# Patient Record
Sex: MALE | Race: WHITE | Employment: UNEMPLOYED | URBAN - NONMETROPOLITAN AREA
[De-identification: names, ages, dates, MRNs, and addresses within clinical notes are randomized per-mention and may not be internally consistent; named-entity substitution may affect disease eponyms.]

---

## 2017-01-01 ENCOUNTER — HOSPITAL ENCOUNTER (OUTPATIENT)
Dept: OTHER | Age: 32
Discharge: HOME | End: 2017-01-31
Attending: PREVENTIVE MEDICINE | Admitting: PREVENTIVE MEDICINE

## 2017-06-22 ENCOUNTER — HOSPITAL ENCOUNTER (OUTPATIENT)
Dept: OTHER | Age: 32
Discharge: HOME | End: 2017-06-30
Attending: PREVENTIVE MEDICINE | Admitting: PREVENTIVE MEDICINE

## 2017-07-01 ENCOUNTER — HOSPITAL ENCOUNTER (OUTPATIENT)
Dept: OTHER | Age: 32
Discharge: HOME | End: 2017-07-31
Attending: PREVENTIVE MEDICINE | Admitting: PREVENTIVE MEDICINE

## 2018-02-06 ENCOUNTER — HOSPITAL ENCOUNTER (OUTPATIENT)
Dept: LAB | Age: 33
Discharge: HOME | End: 2018-02-06
Attending: INTERNAL MEDICINE | Admitting: INTERNAL MEDICINE

## 2018-02-06 LAB
ALBUMIN SERPL-MCNC: 4.3 GM/DL (ref 3.4–5)
ALP BLD-CCNC: 65 IU/L (ref 40–129)
ALT SERPL-CCNC: 11 U/L (ref 10–40)
AST SERPL-CCNC: 16 IU/L (ref 15–37)
BILIRUB SERPL-MCNC: 0.7 MG/DL (ref 0–1)
BILIRUBIN DIRECT: 0.2 MG/DL (ref 0–0.3)
BILIRUBIN, INDIRECT: 0.5 MG/DL (ref 0–0.7)
HEPATITIS B SURFACE ANTIGEN: NON REACTIVE
HEPATITIS C ANTIBODY: ABNORMAL
TOTAL PROTEIN: 6.6 GM/DL (ref 6.4–8.2)

## 2018-02-07 LAB — HIV SCREEN: NON REACTIVE

## 2018-03-16 PROBLEM — F11.93 OPIOID WITHDRAWAL (HCC): Status: ACTIVE | Noted: 2018-03-16

## 2020-10-30 ENCOUNTER — HOSPITAL ENCOUNTER (EMERGENCY)
Age: 35
Discharge: HOME | End: 2020-10-31
Attending: EMERGENCY MEDICINE
Payer: OTHER GOVERNMENT

## 2020-10-30 ENCOUNTER — APPOINTMENT (OUTPATIENT)
Dept: CT IMAGING | Age: 35
End: 2020-10-30
Payer: OTHER GOVERNMENT

## 2020-10-30 LAB
ALBUMIN SERPL-MCNC: 4.2 GM/DL (ref 3.4–5)
ALP BLD-CCNC: 92 IU/L (ref 40–129)
ALT SERPL-CCNC: 9 U/L (ref 10–40)
ANION GAP SERPL CALCULATED.3IONS-SCNC: 13 MMOL/L (ref 4–16)
AST SERPL-CCNC: 13 IU/L (ref 15–37)
BASOPHILS ABSOLUTE: 0 K/CU MM
BASOPHILS RELATIVE PERCENT: 0.2 % (ref 0–1)
BILIRUB SERPL-MCNC: 0.8 MG/DL (ref 0–1)
BILIRUBIN DIRECT: 0.3 MG/DL (ref 0–0.3)
BILIRUBIN, INDIRECT: 0.5 MG/DL (ref 0–0.7)
BUN BLDV-MCNC: 10 MG/DL (ref 6–23)
CALCIUM SERPL-MCNC: 9.7 MG/DL (ref 8.3–10.6)
CHLORIDE BLD-SCNC: 93 MMOL/L (ref 99–110)
CO2: 29 MMOL/L (ref 21–32)
CREAT SERPL-MCNC: 0.7 MG/DL (ref 0.9–1.3)
DIFFERENTIAL TYPE: ABNORMAL
EOSINOPHILS ABSOLUTE: 0.1 K/CU MM
EOSINOPHILS RELATIVE PERCENT: 0.7 % (ref 0–3)
GFR AFRICAN AMERICAN: >60 ML/MIN/1.73M2
GFR NON-AFRICAN AMERICAN: >60 ML/MIN/1.73M2
GLUCOSE BLD-MCNC: 103 MG/DL (ref 70–99)
HCT VFR BLD CALC: 37.6 % (ref 42–52)
HEMOGLOBIN: 12 GM/DL (ref 13.5–18)
IMMATURE NEUTROPHIL %: 0.5 % (ref 0–0.43)
LACTATE: 1.8 MMOL/L (ref 0.4–2)
LYMPHOCYTES ABSOLUTE: 2.2 K/CU MM
LYMPHOCYTES RELATIVE PERCENT: 16.3 % (ref 24–44)
MCH RBC QN AUTO: 25.9 PG (ref 27–31)
MCHC RBC AUTO-ENTMCNC: 31.9 % (ref 32–36)
MCV RBC AUTO: 81 FL (ref 78–100)
MONOCYTES ABSOLUTE: 0.8 K/CU MM
MONOCYTES RELATIVE PERCENT: 5.7 % (ref 0–4)
NUCLEATED RBC %: 0 %
PDW BLD-RTO: 14.5 % (ref 11.7–14.9)
PLATELET # BLD: 277 K/CU MM (ref 140–440)
PMV BLD AUTO: 10 FL (ref 7.5–11.1)
POTASSIUM SERPL-SCNC: 3.6 MMOL/L (ref 3.5–5.1)
RBC # BLD: 4.64 M/CU MM (ref 4.6–6.2)
SEGMENTED NEUTROPHILS ABSOLUTE COUNT: 10.3 K/CU MM
SEGMENTED NEUTROPHILS RELATIVE PERCENT: 76.6 % (ref 36–66)
SODIUM BLD-SCNC: 135 MMOL/L (ref 135–145)
TOTAL IMMATURE NEUTOROPHIL: 0.07 K/CU MM
TOTAL NUCLEATED RBC: 0 K/CU MM
TOTAL PROTEIN: 8.1 GM/DL (ref 6.4–8.2)
WBC # BLD: 13.4 K/CU MM (ref 4–10.5)

## 2020-10-30 PROCEDURE — 96366 THER/PROPH/DIAG IV INF ADDON: CPT

## 2020-10-30 PROCEDURE — 4500000028 HC INTERMEDIATE PROCEDURE

## 2020-10-30 PROCEDURE — 80048 BASIC METABOLIC PNL TOTAL CA: CPT

## 2020-10-30 PROCEDURE — 73206 CT ANGIO UPR EXTRM W/O&W/DYE: CPT

## 2020-10-30 PROCEDURE — 96365 THER/PROPH/DIAG IV INF INIT: CPT

## 2020-10-30 PROCEDURE — 2580000003 HC RX 258: Performed by: EMERGENCY MEDICINE

## 2020-10-30 PROCEDURE — 99284 EMERGENCY DEPT VISIT MOD MDM: CPT

## 2020-10-30 PROCEDURE — 6360000002 HC RX W HCPCS: Performed by: EMERGENCY MEDICINE

## 2020-10-30 PROCEDURE — 87040 BLOOD CULTURE FOR BACTERIA: CPT

## 2020-10-30 PROCEDURE — 83605 ASSAY OF LACTIC ACID: CPT

## 2020-10-30 PROCEDURE — 85025 COMPLETE CBC W/AUTO DIFF WBC: CPT

## 2020-10-30 PROCEDURE — 6360000004 HC RX CONTRAST MEDICATION: Performed by: EMERGENCY MEDICINE

## 2020-10-30 PROCEDURE — 80076 HEPATIC FUNCTION PANEL: CPT

## 2020-10-30 PROCEDURE — 96367 TX/PROPH/DG ADDL SEQ IV INF: CPT

## 2020-10-30 RX ORDER — SODIUM CHLORIDE 0.9 % (FLUSH) 0.9 %
10 SYRINGE (ML) INJECTION 2 TIMES DAILY
Status: DISCONTINUED | OUTPATIENT
Start: 2020-10-30 | End: 2020-10-31 | Stop reason: HOSPADM

## 2020-10-30 RX ADMIN — SODIUM CHLORIDE, PRESERVATIVE FREE 10 ML: 5 INJECTION INTRAVENOUS at 22:23

## 2020-10-30 RX ADMIN — PIPERACILLIN SODIUM, TAZOBACTAM SODIUM 4.5 G: 4; .5 INJECTION, POWDER, LYOPHILIZED, FOR SOLUTION INTRAVENOUS at 21:39

## 2020-10-30 RX ADMIN — VANCOMYCIN HYDROCHLORIDE 1500 MG: 5 INJECTION, POWDER, LYOPHILIZED, FOR SOLUTION INTRAVENOUS at 22:24

## 2020-10-30 RX ADMIN — IOPAMIDOL 80 ML: 755 INJECTION, SOLUTION INTRAVENOUS at 22:18

## 2020-10-30 RX ADMIN — SODIUM CHLORIDE, PRESERVATIVE FREE 10 ML: 5 INJECTION INTRAVENOUS at 22:19

## 2020-10-30 ASSESSMENT — ENCOUNTER SYMPTOMS
SHORTNESS OF BREATH: 0
BACK PAIN: 0
RHINORRHEA: 0
EYE REDNESS: 0
DIARRHEA: 0
VOMITING: 0
COUGH: 0
ABDOMINAL PAIN: 0
NAUSEA: 1
CONSTIPATION: 0
SORE THROAT: 0

## 2020-10-30 ASSESSMENT — PAIN DESCRIPTION - LOCATION: LOCATION: ARM

## 2020-10-30 ASSESSMENT — PAIN SCALES - GENERAL: PAINLEVEL_OUTOF10: 8

## 2020-10-30 ASSESSMENT — PAIN DESCRIPTION - ORIENTATION: ORIENTATION: LEFT
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
## 2020-10-31 VITALS
TEMPERATURE: 98.1 F | OXYGEN SATURATION: 98 % | HEIGHT: 73 IN | WEIGHT: 190 LBS | BODY MASS INDEX: 25.18 KG/M2 | HEART RATE: 86 BPM | DIASTOLIC BLOOD PRESSURE: 76 MMHG | RESPIRATION RATE: 16 BRPM | SYSTOLIC BLOOD PRESSURE: 118 MMHG

## 2020-10-31 LAB
SARS-COV-2, NAAT: NOT DETECTED
SOURCE: NORMAL

## 2020-10-31 PROCEDURE — U0002 COVID-19 LAB TEST NON-CDC: HCPCS

## 2020-10-31 PROCEDURE — 6360000002 HC RX W HCPCS: Performed by: EMERGENCY MEDICINE

## 2020-10-31 PROCEDURE — 90471 IMMUNIZATION ADMIN: CPT | Performed by: EMERGENCY MEDICINE

## 2020-10-31 PROCEDURE — 90714 TD VACC NO PRESV 7 YRS+ IM: CPT | Performed by: EMERGENCY MEDICINE

## 2020-10-31 RX ORDER — DOXYCYCLINE HYCLATE 100 MG
100 TABLET ORAL 2 TIMES DAILY
Qty: 20 TABLET | Refills: 0 | Status: SHIPPED | OUTPATIENT
Start: 2020-10-31 | End: 2020-11-10

## 2020-10-31 RX ORDER — TETANUS AND DIPHTHERIA TOXOIDS ADSORBED 2; 2 [LF]/.5ML; [LF]/.5ML
0.5 INJECTION INTRAMUSCULAR ONCE
Status: COMPLETED | OUTPATIENT
Start: 2020-10-31 | End: 2020-10-31

## 2020-10-31 RX ADMIN — TETANUS AND DIPHTHERIA TOXOIDS ADSORBED 0.5 ML: 2; 2 INJECTION INTRAMUSCULAR at 02:18

## 2020-10-31 ASSESSMENT — PAIN DESCRIPTION - LOCATION: LOCATION: ARM

## 2020-10-31 ASSESSMENT — PAIN SCALES - GENERAL: PAINLEVEL_OUTOF10: 7

## 2020-10-31 ASSESSMENT — PAIN DESCRIPTION - ORIENTATION: ORIENTATION: RIGHT
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
## 2020-11-02 ENCOUNTER — CARE COORDINATION (OUTPATIENT)
Dept: CARE COORDINATION | Age: 35
End: 2020-11-02

## 2020-11-02 NOTE — CARE COORDINATION
Call to check on pt status after recent ER visit for abscess & cellulitis. LM with ACM contact information & requested a call back. PRADEEP Ritchie RN  Ambulatory Care Manager  [phone] office/cell  [phone] fax  [email or web address]

## 2020-11-04 LAB
CULTURE: NORMAL
CULTURE: NORMAL
Lab: NORMAL
Lab: NORMAL
SPECIMEN: NORMAL
SPECIMEN: NORMAL

## 2020-11-05 ENCOUNTER — CARE COORDINATION (OUTPATIENT)
Dept: CARE COORDINATION | Age: 35
End: 2020-11-05

## 2020-11-05 NOTE — CARE COORDINATION
Call to check on pt status after recent ER visit for abscess & cellulitis. LM with ACM contact information & requested a call back. 2nd attempt, no further outreach is planned. TREVOR VilledaN RN  Ambulatory Care Manager  [phone] office/cell  [phone] fax  [email or web address]

## 2020-11-12 ENCOUNTER — HOSPITAL ENCOUNTER (OUTPATIENT)
Dept: PSYCHIATRY | Age: 35
Setting detail: THERAPIES SERIES
Discharge: HOME | End: 2020-11-12
Payer: OTHER GOVERNMENT

## 2020-11-12 PROCEDURE — 90791 PSYCH DIAGNOSTIC EVALUATION: CPT

## 2020-11-12 NOTE — PROGRESS NOTES
[address] [place] [address] Urinalysis Laboratory Testing and Medical History      Location: [x] [place] [] Kelly Forrest MD., [id number], Medical Director of [facility] Director orders for [address] [place] [address] clinical therapists to collect an urine sample from:    Client: Watson Carrel   : 1985   Case# [id number]    Urine sample will be collected following the collections guidelines provided on Clinical Reference Laboratory [facility] [place]) custody form, and completion of the [facility] Non-Federal chain of custody drug screening form. During the course of treatment, randomly a urine sample will be collected, at a minimum of one time a month, more frequently as needed, as part of the clinical outpatient alcohol and drug treatment program at [address] [place] [address]. Medical care recommendation for clients experiencing/reporting  medical concerns, who do not have a family physician and willing to attend  medical care will be assisted in seeking medical care. Clinical providers will refer clients to local family physicians practices as part of the  clients treatment plan and assist the client in gaining access to an appointment. Release of information will be requested to support the  clients seeking medical care. Summary of Medical History  Prior to Admission medications    Medication Sig Start Date End Date Taking?  Authorizing Provider   esomeprazole Magnesium (NEXIUM) 40 MG PACK Take 40 mg by mouth daily    Historical Provider, MD   hydrOXYzine (ATARAX) 50 MG tablet Take 50 mg by mouth every 4 hours as needed for Anxiety    Historical Provider, MD   methocarbamol (ROBAXIN) 500 MG tablet Take 500 mg by mouth 3 times daily    Historical Provider, MD   sertraline (ZOLOFT) 100 MG tablet Take 200 mg by mouth daily    Historical Provider, MD   gabapentin (NEURONTIN) 800 MG tablet Take 800 mg by mouth 4 times daily    Historical Provider, MD     Past Surgical History:   Procedure Laterality Date    ABDOMEN SURGERY      bowel surgery as a       Past Medical History:   Diagnosis Date    Herniated intervertebral disc of lumbar spine     lumbar    Sciatica      Patient Active Problem List    Diagnosis Date Noted    Opioid withdrawal ([facility] 2018    Drug addiction ([facility] 10/26/2016       Jesenia [facility]  /2:98 PM

## 2020-11-12 NOTE — PROGRESS NOTES
medications    Medication Sig Start Date End Date Taking? Authorizing Provider   esomeprazole Magnesium (NEXIUM) 40 MG PACK Take 40 mg by mouth daily    Historical Provider, MD   hydrOXYzine (ATARAX) 50 MG tablet Take 50 mg by mouth every 4 hours as needed for Anxiety    Historical Provider, MD   methocarbamol (ROBAXIN) 500 MG tablet Take 500 mg by mouth 3 times daily    Historical Provider, MD   sertraline (ZOLOFT) 100 MG tablet Take 200 mg by mouth daily    Historical Provider, MD   gabapentin (NEURONTIN) 800 MG tablet Take 800 mg by mouth 4 times daily    Historical Provider, MD     Past Surgical History:   Procedure Laterality Date    ABDOMEN SURGERY      bowel surgery as a       Past Medical History:   Diagnosis Date    Herniated intervertebral disc of lumbar spine     lumbar    Sciatica      Patient Active Problem List    Diagnosis Date Noted    Opioid withdrawal ([facility] 2018    Drug addiction ([facility] 10/26/2016       6. Level of Care Determination:      4 Medically Managed Intensive Inpatient Services      7. Treatment available      __X__yes   _____no         8. Name of program referred:    ____[facility],    ____[facility],       ___X___other/identify    [address] [place] [address]    Client referred to Matt Bentley for Detox.  Client reports that he will follow-up with the  [facility]/Suboxone Program for continued AoD Support    Electronically signed by Sarthak Moore on 2020 at 4:38 PM

## 2020-11-13 ASSESSMENT — LIFESTYLE VARIABLES: HISTORY_ALCOHOL_USE: NO

## 2020-11-13 NOTE — PROGRESS NOTES
[facility]                Progress Note    [x] Jamia  [] Lakisha prajapati                    Patient Name: Jorge Gaxiola   : 1985     Case # :  [id number]  Therapist: Kelechi [place]        Objective/Service/Time:  CASE MANAGEMENT    This writer received a message from [address] [place] [address] who reported that the referral for above client was approved and to call them after 10:00 Friday. This writer called Access (10:26am) and left a message for Adam Mabry reporting that I did receive her message.                       Jailene Mccoy MA, [facility], , 46:04 AM

## 2020-11-25 ENCOUNTER — HOSPITAL ENCOUNTER (EMERGENCY)
Age: 35
Discharge: HOME | DRG: 581 | End: 2020-11-25
Payer: OTHER GOVERNMENT

## 2020-11-25 VITALS
DIASTOLIC BLOOD PRESSURE: 78 MMHG | TEMPERATURE: 99.3 F | RESPIRATION RATE: 15 BRPM | SYSTOLIC BLOOD PRESSURE: 101 MMHG | HEART RATE: 92 BPM | OXYGEN SATURATION: 100 %

## 2020-11-25 PROCEDURE — 99283 EMERGENCY DEPT VISIT LOW MDM: CPT

## 2020-11-25 PROCEDURE — 87077 CULTURE AEROBIC IDENTIFY: CPT

## 2020-11-25 PROCEDURE — 87186 SC STD MICRODIL/AGAR DIL: CPT

## 2020-11-25 PROCEDURE — 10060 I&D ABSCESS SIMPLE/SINGLE: CPT

## 2020-11-25 PROCEDURE — 87070 CULTURE OTHR SPECIMN AEROBIC: CPT

## 2020-11-25 PROCEDURE — 87075 CULTR BACTERIA EXCEPT BLOOD: CPT

## 2020-11-25 PROCEDURE — 2500000003 HC RX 250 WO HCPCS: Performed by: PHYSICIAN ASSISTANT

## 2020-11-25 RX ORDER — LIDOCAINE HYDROCHLORIDE 20 MG/ML
10 INJECTION, SOLUTION EPIDURAL; INFILTRATION; INTRACAUDAL; PERINEURAL ONCE
Status: COMPLETED | OUTPATIENT
Start: 2020-11-25 | End: 2020-11-25

## 2020-11-25 RX ORDER — SULFAMETHOXAZOLE AND TRIMETHOPRIM 800; 160 MG/1; MG/1
1 TABLET ORAL 2 TIMES DAILY
Qty: 20 TABLET | Refills: 0 | Status: ON HOLD | OUTPATIENT
Start: 2020-11-25 | End: 2020-12-01 | Stop reason: HOSPADM

## 2020-11-25 RX ADMIN — LIDOCAINE HYDROCHLORIDE 10 ML: 20 INJECTION, SOLUTION EPIDURAL; INFILTRATION; INTRACAUDAL; PERINEURAL at 08:57

## 2020-11-25 ASSESSMENT — PAIN SCALES - GENERAL: PAINLEVEL_OUTOF10: 10

## 2020-11-25 NOTE — ED NOTES
Wound cleansed and dressed appropriately. Wound care discussed. Discharge instructions reviewed with patient. Medication and follow up were discussed.  Patient denies further questions and verbalizes understanding     Livan RN  11/25/20 0924

## 2020-11-25 NOTE — ED PROVIDER NOTES
EMERGENCY DEPARTMENT ENCOUNTER      PCP: No primary care provider on file. CHIEF COMPLAINT    Chief Complaint   Patient presents with    Abscess       This patient was not evaluated by the attending physician. I have independently evaluated this patient. HPI    Watson Carrel is a 37 y.o. male who presents with swelling and redness to right forearm. Onset over the past day or 2. Patient states he is a previous IV drug user, has not used in 5 days. Patient recently discharged from rehab. Patient states he has had abscess in the past.  Patient denies chest pain, shortness of breath, abdominal pain, vomiting or diarrhea. Patient denies fever. Pain worsens with palpation and movement. No known alleviating factors.       REVIEW OF SYSTEMS    Constitutional:  Denies fever  HENT:  Denies sore throat or ear pain   Respiratory:  Denies cough or shortness of breath   Cardiovascular:  Denies chest pain, palpitations or swelling   GI:  Denies abdominal pain, vomiting, or diarrhea   Musculoskeletal:  Denies back pain   Skin:  See HPI  Neurologic:  Denies headache, focal weakness or sensory changes   Lymphatic:  Denies swollen glands     All other review of systems are negative  See HPI and nursing notes for additional information     PAST MEDICAL HISTORY    Past Medical History:   Diagnosis Date    Herniated intervertebral disc of lumbar spine     lumbar    Sciatica        SURGICAL HISTORY    Past Surgical History:   Procedure Laterality Date    ABDOMEN SURGERY      bowel surgery as a         CURRENT MEDICATIONS    Current Outpatient Rx   Medication Sig Dispense Refill    esomeprazole Magnesium (NEXIUM) 40 MG PACK Take 40 mg by mouth daily      hydrOXYzine (ATARAX) 50 MG tablet Take 50 mg by mouth every 4 hours as needed for Anxiety      methocarbamol (ROBAXIN) 500 MG tablet Take 500 mg by mouth 3 times daily      sertraline (ZOLOFT) 100 MG tablet Take 200 mg by mouth daily      gabapentin Constitutional:  Well developed, well nourished, no acute distress, non-toxic appearance   HENT:  Atraumatic, Normocephalic, PERRL. Respiratory:  No respiratory distress, normal breath sounds   Cardiovascular:  Normal rate, normal rhythm, peripheral pulses equal, no edema  GI:  Soft, nondistended, nontender  Musculoskeletal:  No edema, no tenderness, no deformities. Integument: Right proximal forearm along the medial aspect there is an area of erythema measuring approximately 4 cm x 3 cm. Area is fluctuant with surrounding induration. No lymphatic streaking. Distal sensation and pulses intact. Normal range of motion at elbow. Lymphatics: No palpable axillary lymphadenopathy. Neurological: alert and oriented, no focal deficits         ________________________________________________________________________    PROCEDURES: Incision and Drainage Procedure Note    Indication: Cutaneous Abscess    Procedure:    - Procedure explained, including risks and benefits explained to the patient who expressed understanding. All questions were answered. Verbal consent obtained. - The patient was positioned appropriately and Area was prepped and draped in the usual sterile fashion using Betadine.     - The affected area was anesthetized using 2% lidocaine with 7 mL. - Area of greatest induration was incised utilizing a #11 blade - incision length was approximately 2 cm. - Wound culture was obtained and sent to lab. - large amount of pus mixed with blood was expressed. Loculations were broken up using a blunt probe technique. Cavity was irrigated with sterile saline.   - Wound cavity was packed with 1/4\" iodoform gauze   - Wound was dressed with sterile nonstick adhesive gauze, sterile 4x4 gauze, and tape       The patient tolerated the procedure well without complication.     ________________________________________________________________________      ED COURSE & MEDICAL DECISION MAKING      Patient presents as above. Patient's vital signs are stable. Patient range of motion is at elbow without problem. This does not appear to be septic joint. No lymphatic streaking. Patient declines lab work. See above procedure note for incision and drainage. Patient started on Bactrim DS. I recommend over-the-counter ibuprofen and Tylenol as needed for pain. Recommend follow-up with primary care provider in 2 to 3 days for recheck. Clinical  IMPRESSION    Cutaneous Abscess      Diagnosis and plan discussed in detail with patient who understands and agrees. Patient agrees to return emergency department if symptoms worsen or any new symptoms develop. Comment: Please note this report has been produced using speech recognition software and may contain errors related to that system including errors in grammar, punctuation, and spelling, as well as words and phrases that may be inappropriate. If there are any questions or concerns please feel free to contact the dictating provider for clarification.         Chaparro Farrell PA-C  11/25/20 8087

## 2020-11-28 ENCOUNTER — APPOINTMENT (OUTPATIENT)
Dept: GENERAL RADIOLOGY | Age: 35
DRG: 581 | End: 2020-11-28
Payer: OTHER GOVERNMENT

## 2020-11-28 ENCOUNTER — HOSPITAL ENCOUNTER (INPATIENT)
Age: 35
LOS: 3 days | Discharge: HOME HEALTH | DRG: 581 | End: 2020-12-01
Attending: INTERNAL MEDICINE | Admitting: INTERNAL MEDICINE
Payer: OTHER GOVERNMENT

## 2020-11-28 ENCOUNTER — APPOINTMENT (OUTPATIENT)
Dept: CT IMAGING | Age: 35
DRG: 581 | End: 2020-11-28
Payer: OTHER GOVERNMENT

## 2020-11-28 PROBLEM — L02.413 ABSCESS OF FOREARM, RIGHT: Status: ACTIVE | Noted: 2020-11-28

## 2020-11-28 LAB
ALBUMIN SERPL-MCNC: 3.9 GM/DL (ref 3.4–5)
ALP BLD-CCNC: 78 IU/L (ref 40–129)
ALT SERPL-CCNC: 6 U/L (ref 10–40)
ANION GAP SERPL CALCULATED.3IONS-SCNC: 12 MMOL/L (ref 4–16)
AST SERPL-CCNC: 15 IU/L (ref 15–37)
BASOPHILS ABSOLUTE: 0 K/CU MM
BASOPHILS RELATIVE PERCENT: 0.4 % (ref 0–1)
BILIRUB SERPL-MCNC: 0.5 MG/DL (ref 0–1)
BUN BLDV-MCNC: 7 MG/DL (ref 6–23)
CALCIUM SERPL-MCNC: 9.3 MG/DL (ref 8.3–10.6)
CHLORIDE BLD-SCNC: 97 MMOL/L (ref 99–110)
CO2: 26 MMOL/L (ref 21–32)
CREAT SERPL-MCNC: 0.7 MG/DL (ref 0.9–1.3)
DIFFERENTIAL TYPE: ABNORMAL
EOSINOPHILS ABSOLUTE: 0.1 K/CU MM
EOSINOPHILS RELATIVE PERCENT: 0.8 % (ref 0–3)
GFR AFRICAN AMERICAN: >60 ML/MIN/1.73M2
GFR NON-AFRICAN AMERICAN: >60 ML/MIN/1.73M2
GLUCOSE BLD-MCNC: 104 MG/DL (ref 70–99)
HCT VFR BLD CALC: 43 % (ref 42–52)
HEMOGLOBIN: 14 GM/DL (ref 13.5–18)
IMMATURE NEUTROPHIL %: 0.5 % (ref 0–0.43)
LACTATE: 1.2 MMOL/L (ref 0.4–2)
LYMPHOCYTES ABSOLUTE: 1.4 K/CU MM
LYMPHOCYTES RELATIVE PERCENT: 13 % (ref 24–44)
MCH RBC QN AUTO: 25.5 PG (ref 27–31)
MCHC RBC AUTO-ENTMCNC: 32.6 % (ref 32–36)
MCV RBC AUTO: 78.2 FL (ref 78–100)
MONOCYTES ABSOLUTE: 0.6 K/CU MM
MONOCYTES RELATIVE PERCENT: 5 % (ref 0–4)
NUCLEATED RBC %: 0 %
PDW BLD-RTO: 15.9 % (ref 11.7–14.9)
PLATELET # BLD: 304 K/CU MM (ref 140–440)
PMV BLD AUTO: 9.7 FL (ref 7.5–11.1)
POTASSIUM SERPL-SCNC: 5 MMOL/L (ref 3.5–5.1)
RBC # BLD: 5.5 M/CU MM (ref 4.6–6.2)
REASON FOR REJECTION: NORMAL
REASON FOR REJECTION: NORMAL
REJECTED TEST: NORMAL
REJECTED TEST: NORMAL
SEGMENTED NEUTROPHILS ABSOLUTE COUNT: 8.9 K/CU MM
SEGMENTED NEUTROPHILS RELATIVE PERCENT: 80.3 % (ref 36–66)
SODIUM BLD-SCNC: 135 MMOL/L (ref 135–145)
TOTAL IMMATURE NEUTOROPHIL: 0.06 K/CU MM
TOTAL NUCLEATED RBC: 0 K/CU MM
TOTAL PROTEIN: 7.5 GM/DL (ref 6.4–8.2)
TROPONIN T: <0.01 NG/ML
WBC # BLD: 11.1 K/CU MM (ref 4–10.5)

## 2020-11-28 PROCEDURE — 96367 TX/PROPH/DG ADDL SEQ IV INF: CPT

## 2020-11-28 PROCEDURE — 36415 COLL VENOUS BLD VENIPUNCTURE: CPT

## 2020-11-28 PROCEDURE — 84484 ASSAY OF TROPONIN QUANT: CPT

## 2020-11-28 PROCEDURE — 2580000003 HC RX 258: Performed by: INTERNAL MEDICINE

## 2020-11-28 PROCEDURE — 96365 THER/PROPH/DIAG IV INF INIT: CPT

## 2020-11-28 PROCEDURE — 80053 COMPREHEN METABOLIC PANEL: CPT

## 2020-11-28 PROCEDURE — 83605 ASSAY OF LACTIC ACID: CPT

## 2020-11-28 PROCEDURE — 6360000002 HC RX W HCPCS: Performed by: INTERNAL MEDICINE

## 2020-11-28 PROCEDURE — 94761 N-INVAS EAR/PLS OXIMETRY MLT: CPT

## 2020-11-28 PROCEDURE — 6360000002 HC RX W HCPCS: Performed by: PHYSICIAN ASSISTANT

## 2020-11-28 PROCEDURE — 85025 COMPLETE CBC W/AUTO DIFF WBC: CPT

## 2020-11-28 PROCEDURE — 71045 X-RAY EXAM CHEST 1 VIEW: CPT

## 2020-11-28 PROCEDURE — 6370000000 HC RX 637 (ALT 250 FOR IP): Performed by: INTERNAL MEDICINE

## 2020-11-28 PROCEDURE — 73201 CT UPPER EXTREMITY W/DYE: CPT

## 2020-11-28 PROCEDURE — 2580000003 HC RX 258: Performed by: PHYSICIAN ASSISTANT

## 2020-11-28 PROCEDURE — 6360000004 HC RX CONTRAST MEDICATION: Performed by: PHYSICIAN ASSISTANT

## 2020-11-28 PROCEDURE — 93005 ELECTROCARDIOGRAM TRACING: CPT | Performed by: PHYSICIAN ASSISTANT

## 2020-11-28 PROCEDURE — 96366 THER/PROPH/DIAG IV INF ADDON: CPT

## 2020-11-28 PROCEDURE — 1200000000 HC SEMI PRIVATE

## 2020-11-28 PROCEDURE — 87040 BLOOD CULTURE FOR BACTERIA: CPT

## 2020-11-28 PROCEDURE — 99285 EMERGENCY DEPT VISIT HI MDM: CPT

## 2020-11-28 RX ORDER — GABAPENTIN 400 MG/1
800 CAPSULE ORAL 4 TIMES DAILY
Status: DISCONTINUED | OUTPATIENT
Start: 2020-11-28 | End: 2020-11-28

## 2020-11-28 RX ORDER — METHOCARBAMOL 500 MG/1
500 TABLET, FILM COATED ORAL 3 TIMES DAILY
Status: DISCONTINUED | OUTPATIENT
Start: 2020-11-28 | End: 2020-12-01 | Stop reason: HOSPADM

## 2020-11-28 RX ORDER — ACETAMINOPHEN 325 MG/1
650 TABLET ORAL EVERY 6 HOURS PRN
Status: DISCONTINUED | OUTPATIENT
Start: 2020-11-28 | End: 2020-12-01 | Stop reason: HOSPADM

## 2020-11-28 RX ORDER — SERTRALINE HYDROCHLORIDE 100 MG/1
200 TABLET, FILM COATED ORAL DAILY
Status: DISCONTINUED | OUTPATIENT
Start: 2020-11-29 | End: 2020-11-28

## 2020-11-28 RX ORDER — ACETAMINOPHEN 650 MG/1
650 SUPPOSITORY RECTAL EVERY 6 HOURS PRN
Status: DISCONTINUED | OUTPATIENT
Start: 2020-11-28 | End: 2020-12-01 | Stop reason: HOSPADM

## 2020-11-28 RX ORDER — ESOMEPRAZOLE MAGNESIUM 40 MG/1
40 FOR SUSPENSION ORAL
Status: DISCONTINUED | OUTPATIENT
Start: 2020-11-29 | End: 2020-11-28 | Stop reason: CLARIF

## 2020-11-28 RX ORDER — SODIUM CHLORIDE 9 MG/ML
INJECTION, SOLUTION INTRAVENOUS CONTINUOUS
Status: DISCONTINUED | OUTPATIENT
Start: 2020-11-28 | End: 2020-12-01 | Stop reason: HOSPADM

## 2020-11-28 RX ORDER — PANTOPRAZOLE SODIUM 40 MG/1
40 TABLET, DELAYED RELEASE ORAL
Status: DISCONTINUED | OUTPATIENT
Start: 2020-11-29 | End: 2020-12-01 | Stop reason: HOSPADM

## 2020-11-28 RX ORDER — ONDANSETRON 2 MG/ML
4 INJECTION INTRAMUSCULAR; INTRAVENOUS EVERY 6 HOURS PRN
Status: DISCONTINUED | OUTPATIENT
Start: 2020-11-28 | End: 2020-12-01 | Stop reason: HOSPADM

## 2020-11-28 RX ORDER — BUPRENORPHINE HYDROCHLORIDE AND NALOXONE HYDROCHLORIDE DIHYDRATE 8; 2 MG/1; MG/1
1 TABLET SUBLINGUAL 3 TIMES DAILY
COMMUNITY

## 2020-11-28 RX ORDER — PROMETHAZINE HYDROCHLORIDE 12.5 MG/1
12.5 TABLET ORAL EVERY 6 HOURS PRN
Status: DISCONTINUED | OUTPATIENT
Start: 2020-11-28 | End: 2020-12-01 | Stop reason: HOSPADM

## 2020-11-28 RX ORDER — POLYETHYLENE GLYCOL 3350 17 G/17G
17 POWDER, FOR SOLUTION ORAL DAILY PRN
Status: DISCONTINUED | OUTPATIENT
Start: 2020-11-28 | End: 2020-12-01 | Stop reason: HOSPADM

## 2020-11-28 RX ORDER — NICOTINE 21 MG/24HR
1 PATCH, TRANSDERMAL 24 HOURS TRANSDERMAL DAILY
Status: DISCONTINUED | OUTPATIENT
Start: 2020-11-28 | End: 2020-12-01 | Stop reason: HOSPADM

## 2020-11-28 RX ORDER — BUPRENORPHINE HYDROCHLORIDE AND NALOXONE HYDROCHLORIDE DIHYDRATE 8; 2 MG/1; MG/1
1 TABLET SUBLINGUAL 3 TIMES DAILY
Status: DISCONTINUED | OUTPATIENT
Start: 2020-11-28 | End: 2020-12-01 | Stop reason: HOSPADM

## 2020-11-28 RX ORDER — SODIUM CHLORIDE 0.9 % (FLUSH) 0.9 %
10 SYRINGE (ML) INJECTION PRN
Status: DISCONTINUED | OUTPATIENT
Start: 2020-11-28 | End: 2020-12-01 | Stop reason: HOSPADM

## 2020-11-28 RX ORDER — GABAPENTIN 400 MG/1
800 CAPSULE ORAL 3 TIMES DAILY
Status: DISCONTINUED | OUTPATIENT
Start: 2020-11-29 | End: 2020-12-01 | Stop reason: HOSPADM

## 2020-11-28 RX ORDER — SODIUM CHLORIDE 0.9 % (FLUSH) 0.9 %
10 SYRINGE (ML) INJECTION EVERY 12 HOURS SCHEDULED
Status: DISCONTINUED | OUTPATIENT
Start: 2020-11-28 | End: 2020-12-01 | Stop reason: HOSPADM

## 2020-11-28 RX ADMIN — METHOCARBAMOL TABLETS 500 MG: 500 TABLET, COATED ORAL at 22:17

## 2020-11-28 RX ADMIN — VANCOMYCIN HYDROCHLORIDE 1500 MG: 5 INJECTION, POWDER, LYOPHILIZED, FOR SOLUTION INTRAVENOUS at 14:31

## 2020-11-28 RX ADMIN — CEFEPIME 2 G: 2 INJECTION, POWDER, FOR SOLUTION INTRAVENOUS at 13:56

## 2020-11-28 RX ADMIN — IOPAMIDOL 80 ML: 755 INJECTION, SOLUTION INTRAVENOUS at 16:39

## 2020-11-28 RX ADMIN — CEFEPIME HYDROCHLORIDE 1 G: 1 INJECTION, POWDER, FOR SOLUTION INTRAMUSCULAR; INTRAVENOUS at 22:17

## 2020-11-28 RX ADMIN — GABAPENTIN 800 MG: 400 CAPSULE ORAL at 22:17

## 2020-11-28 RX ADMIN — SODIUM CHLORIDE: 9 INJECTION, SOLUTION INTRAVENOUS at 21:04

## 2020-11-28 RX ADMIN — BUPRENORPHINE AND NALOXONE 1 TABLET: 8; 2 TABLET SUBLINGUAL at 22:17

## 2020-11-28 RX ADMIN — SODIUM CHLORIDE, PRESERVATIVE FREE 10 ML: 5 INJECTION INTRAVENOUS at 21:09

## 2020-11-28 ASSESSMENT — PAIN DESCRIPTION - DESCRIPTORS
DESCRIPTORS: ACHING;CONSTANT
DESCRIPTORS: CONSTANT
DESCRIPTORS: ACHING;CONSTANT

## 2020-11-28 ASSESSMENT — PAIN SCALES - GENERAL
PAINLEVEL_OUTOF10: 7
PAINLEVEL_OUTOF10: 8
PAINLEVEL_OUTOF10: 7
PAINLEVEL_OUTOF10: 7

## 2020-11-28 ASSESSMENT — PAIN DESCRIPTION - PAIN TYPE
TYPE: ACUTE PAIN

## 2020-11-28 ASSESSMENT — PAIN DESCRIPTION - LOCATION
LOCATION: ARM

## 2020-11-28 ASSESSMENT — PAIN DESCRIPTION - ORIENTATION
ORIENTATION: RIGHT

## 2020-11-28 NOTE — ED PROVIDER NOTES
The Ekg interpreted by me shows  normal sinus rhythm with a rate of 86  Axis is   Normal  QTc is  prolonged  Incomplete RBBB     ST Segments: no acute change  No old EKG            Carmen Llanes MD  11/28/20 0205

## 2020-11-28 NOTE — ED NOTES
Waited for labs to be drawn and resulted before taking pt to CT per protocol   3349,1684 no labs/IV  2467,6218, [id number] iv but no labs

## 2020-11-28 NOTE — CONSULTS
[facility]    Romel Tamayo is a 37 y.o. male started on vancomycin for Wound infection. Pharmacy consulted by ED provider DANY Hurd to order a dose of vancomycin in the emergency department. Other antimicrobials: none. Ht Readings from Last 1 Encounters:   11/28/20 6' (1.829 m)     Wt Readings from Last 3 Encounters:   11/28/20 170 lb (77.1 kg)   10/30/20 190 lb (86.2 kg)   08/18/18 180 lb (81.6 kg)        Pertinent Laboratory Values:   Temp Readings from Last 3 Encounters:   11/28/20 98.1 °F (36.7 °C) (Oral)   11/25/20 99.3 °F (37.4 °C) (Oral)   10/30/20 98.1 °F (36.7 °C)     No results for input(s): WBC, LACTATE in the last 72 hours. No results for input(s): BUN, CREATININE in the last 72 hours. CrCl cannot be calculated (Patient's most recent lab result is older than the maximum 10 days allowed. ). No intake or output data in the 24 hours ending 11/28/20 1221    Assessment/Plan:  Pharmacy will order vancomycin 1500 mg (20 mg/kg). Please note, pharmacy will order a one-time dose of vancomycin for the Emergency Department. The consult will need to be re-ordered if vancomycin is to continue upon admission. Thank you for the consult.   Ramya Jimenes Hence  11/28/2020 12:21 PM

## 2020-11-28 NOTE — ED PROVIDER NOTES
Patient Identification  Flora Clark is a 37 y.o. male    Chief Complaint  Wound Check (reports I&D to right arm abscess, redness around site)      HPI  (History provided by patient)  This is a 37 y.o. male who was brought in by self for chief complaint of wound check. Patient admits to history of IV drug abuse, last use 10 days ago. Developed abscess in right forearm and was seen here 3 days ago and had incision and drainage performed, states that he thought he was getting better until this morning when area is much more swollen and red and painful. Reports 8/10 aching constant pain. No numbness or weakness. No fevers. Notes occasional odd sensation in chest that is not currently present. No shortness of breath. No swelling in legs. REVIEW OF SYSTEMS    Constitutional:  Denies fever, chills  HENT:  Denies sore throat or ear pain   Eyes: Denies vision changes, eye pain  Cardiovascular:  Denies chest pain, syncope  Respiratory:  Denies shortness of breath, cough   GI:  Denies abdominal pain, nausea, vomiting  :  Denies dysuria, discharge  Musculoskeletal:  Denies back pain. + arm pain  Skin:  Denies pruritis.  + rash  Neurologic:  Denies headache, focal weakness, or sensory changes     See HPI and nursing notes for additional information     I have reviewed the following nursing documentation:  Allergies: Allergies   Allergen Reactions    Banana Swelling    Ceclor [Cefaclor] Rash    Amoxicillin Diarrhea       Past medical history:  has a past medical history of Herniated intervertebral disc of lumbar spine and Sciatica. Past surgical history:  has a past surgical history that includes Abdomen surgery. Home medications:   Prior to Admission medications    Medication Sig Start Date End Date Taking?  Authorizing Provider   sulfamethoxazole-trimethoprim (BACTRIM DS) 800-160 MG per tablet Take 1 tablet by mouth 2 times daily for 10 days 11/25/20 12/5/20  Brendon Clements PA-C esomeprazole Magnesium (NEXIUM) 40 MG PACK Take 40 mg by mouth daily    Historical Provider, MD   hydrOXYzine (ATARAX) 50 MG tablet Take 50 mg by mouth every 4 hours as needed for Anxiety    Historical Provider, MD   methocarbamol (ROBAXIN) 500 MG tablet Take 500 mg by mouth 3 times daily    Historical Provider, MD   sertraline (ZOLOFT) 100 MG tablet Take 200 mg by mouth daily    Historical Provider, MD   gabapentin (NEURONTIN) 800 MG tablet Take 800 mg by mouth 4 times daily    Historical Provider, MD       Social history:  reports that he has been smoking cigarettes. He has been smoking about 1.00 pack per day. He has never used smokeless tobacco. He reports current drug use. Drugs: Marijuana and IV. He reports that he does not drink alcohol. Family history:    Family History   Problem Relation Age of Onset    High Blood Pressure Father     Alcohol Abuse Father     Heart Disease Brother     Alcohol Abuse Maternal Grandmother     Alcohol Abuse Maternal Grandfather     Heart Disease Paternal Grandfather     Anxiety Disorder Brother          Exam  BP (!) 102/49   Pulse 65   Temp 98.1 °F (36.7 °C) (Oral)   Resp 17   Ht 6' (1.829 m)   Wt 170 lb (77.1 kg)   SpO2 99%   BMI 23.06 kg/m²   Nursing note and vitals reviewed. Constitutional: Well developed, well nourished. No acute distress. HENT:      Head: Normocephalic and atraumatic. Ears: External ears normal.      Nose: Nose normal.     Mouth: Membrane mucosa moist and pink. No posterior oropharynx erythema or tonsillar edema  Eyes: Anicteric sclera. No discharge, PERRL  Neck: Supple. Trachea midline. Cardiovascular: RRR, no murmurs, rubs, or gallops, radial pulses 2+ bilaterally. Pulmonary/Chest: Effort normal. No respiratory distress. CTAB. No stridor. No wheezes. No rales. Abdominal: Soft. Nontender to palpation. No distension. No guarding, rebound tenderness, or evidence of ascites. : No CVA tenderness.     Musculoskeletal: Moves all extremities. No gross deformity. Neurological: Alert and oriented to person, place, and time. Normal muscle tone.  strength 5 out of 5 bilaterally. Sensation light touch intact throughout the bilateral upper extremities. Skin: Warm and dry. There is a large area of erythema and beefy edema noted on the proximal right ulnar forearm. Psychiatric: Normal mood and affect. Behavior is normal.      EKG   Please see Dr. Da Martinez note for EKG read. Radiographs (if obtained):  [] The following radiograph was interpreted by myself in the absence of a radiologist:   [x] Radiologist's Report Reviewed:  CT RADIUS ULNA RIGHT W CONTRAST   Final Result   1. 9.5 cm abscess in the ulnar soft tissues of the mid and proximal forearm. 2. Extensive cellulitis. 3. No acute osseous abnormality. XR CHEST PORTABLE   Final Result   1. No acute cardiopulmonary disease.                 Labs  Results for orders placed or performed during the hospital encounter of 11/28/20   CBC Auto Differential   Result Value Ref Range    WBC 11.1 (H) 4.0 - 10.5 K/CU MM    RBC 5.50 4.6 - 6.2 M/CU MM    Hemoglobin 14.0 13.5 - 18.0 GM/DL    Hematocrit 43.0 42 - 52 %    MCV 78.2 78 - 100 FL    MCH 25.5 (L) 27 - 31 PG    MCHC 32.6 32.0 - 36.0 %    RDW 15.9 (H) 11.7 - 14.9 %    Platelets 889 104 - 742 K/CU MM    MPV 9.7 7.5 - 11.1 FL    Differential Type AUTOMATED DIFFERENTIAL     Segs Relative 80.3 (H) 36 - 66 %    Lymphocytes % 13.0 (L) 24 - 44 %    Monocytes % 5.0 (H) 0 - 4 %    Eosinophils % 0.8 0 - 3 %    Basophils % 0.4 0 - 1 %    Segs Absolute 8.9 K/CU MM    Lymphocytes Absolute 1.4 K/CU MM    Monocytes Absolute 0.6 K/CU MM    Eosinophils Absolute 0.1 K/CU MM    Basophils Absolute 0.0 K/CU MM    Nucleated RBC % 0.0 %    Total Nucleated RBC 0.0 K/CU MM    Total Immature Neutrophil 0.06 K/CU MM    Immature Neutrophil % 0.5 (H) 0 - 0.43 %   Lactic Acid, Plasma   Result Value Ref Range    Lactate 1.2 0.4 - 2.0 mMOL/L SPECIMEN REJECTION   Result Value Ref Range    Rejected Test CMPR     Reason for Rejection UNABLE TO PERFORM TESTING:    SPECIMEN REJECTION   Result Value Ref Range    Rejected Test TROT     Reason for Rejection UNABLE TO PERFORM TESTING:    Comprehensive Metabolic Panel   Result Value Ref Range    Sodium 135 135 - 145 MMOL/L    Potassium 5.0 3.5 - 5.1 MMOL/L    Chloride 97 (L) 99 - 110 mMol/L    CO2 26 21 - 32 MMOL/L    BUN 7 6 - 23 MG/DL    CREATININE 0.7 (L) 0.9 - 1.3 MG/DL    Glucose 104 (H) 70 - 99 MG/DL    Calcium 9.3 8.3 - 10.6 MG/DL    Alb 3.9 3.4 - 5.0 GM/DL    Total Protein 7.5 6.4 - 8.2 GM/DL    Total Bilirubin 0.5 0.0 - 1.0 MG/DL    ALT 6 (L) 10 - 40 U/L    AST 15 15 - 37 IU/L    Alkaline Phosphatase 78 40 - 129 IU/L    GFR Non-African American >60 >60 mL/min/1.73m2    GFR African American >60 >60 mL/min/1.73m2    Anion Gap 12 4 - 16   Troponin   Result Value Ref Range    Troponin T <0.010 <0.01 NG/ML   EKG 12 Lead   Result Value Ref Range    Ventricular Rate 86 BPM    Atrial Rate 86 BPM    P-R Interval 154 ms    QRS Duration 94 ms    Q-T Interval 402 ms    QTc Calculation (Bazett) 481 ms    P Axis 58 degrees    R Axis 64 degrees    T Axis 58 degrees    Diagnosis       Normal sinus rhythm  Incomplete right bundle branch block  Nonspecific T wave abnormality  Prolonged QT  Abnormal ECG  When compared with ECG of 18-AUG-2018 21:07,  QT has lengthened           MDM  Patient presents for worsening abscess in right forearm. Had I&D performed here few days ago and initially was better but much worse today. Has a large area of swelling and redness in the right ulnar forearm. CT concerning for large abscess. White count is elevated with mild left shift. Lactic acid normal.  Of note patient did complain of some occasional chest discomfort, EKG is normal sinus rhythm with right bundle branch block, chest x-ray negative, troponin negative. Plan is to admit.   Consult placed to Dr. Melissa Anderson with surgery who agreed to consult on patient, likely operative management tomorrow morning. Requesting patient be n.p.o. at midnight and no Lovenox. Consult placed to hospitalist who agreed to admit. In consideration of current COVID19 pandemic, with effort to minimize unnecessary provider exposure, this patient was seen at bedside by me independently. However, in compliance with current hospital [facility]/ED protocol, prior to admission I did discuss this patient case with emergency department physician, Dr. Tim Villasenor. Of note, this Pt was NOT admitted to the ICU. Final Impression  1. Abscess of forearm, right    2. Failure of outpatient treatment    3. IV drug abuse ([address]    4. Chest pain, unspecified type        Blood pressure (!) 102/49, pulse 65, temperature 98.1 °F (36.7 °C), temperature source Oral, resp. rate 17, height 6' (1.829 m), weight 170 lb (77.1 kg), SpO2 99 %. Disposition:  Admit to med/surg floor in stable condition. Patient was given scripts for the following medications. I counseled patient how to take these medications. New Prescriptions    No medications on file       This chart was generated using the [address] [place] [address] dictation system. I created this record but it may contain dictation errors given the limitations of this technology.        [address] [facility]  11/28/20 8733

## 2020-11-28 NOTE — ED NOTES
Pt stated he was in the ED the day before Thanksgiving for I&D of his right arm, pt presented today with redness and swelling to right arm, minimal drainage noted at this time. Pt stated having chest pain.      Tammi Aparicio, RN  11/28/20 Doctor Uvaldo Gupta RN  11/28/20 2420

## 2020-11-28 NOTE — H&P
HISTORY AND PHYSICAL  (Hospitalist, Internal Medicine)  IDENTIFYING INFORMATION   PATIENT:  Valarie Montgomery  MRN:  [id number]  ADMIT DATE: 2020      CHIEF COMPLAINT   Worsening swelling in right forearm    HISTORY OF PRESENT ILLNESS   Valarie Montgomery is a 37 y.o. male with history of opioid abuse in the past, s/p rehab, chronic tobacco dependence presented to ED with worsening swelling in his right forearm. Patient reported that he noticed swelling few days ago, was seen in ER 2020, had an I&D and was discharged on Bactrim. Patient reported being compliant with his medications. But the swelling was getting worse. Patient denied any fever, chills. Patient denied using any IV drugs. Denied any other complaints-no chest pain, shortness of breath, abdominal pain. At presentation patient was noted to have /80, HR 92, RR 17, temperature 98.1, saturating 98% on room air. Lab work significant for potassium 5, chloride 97, troponin less than 0.010, WBC 11.1, hemoglobin 14, platelets 430. CT reported 9.5 cm abscess in the ulnar soft tissues of the mid and proximal forearm, extensive cellulitis. No soft tissue gas or foreign body. General surgeon-Dr. Dawit Ferguson was consulted from ED. PAST MEDICAL HISTORY PAST SURGICAL HISTORY   Opioid abuse in the past, chronic tobacco dependence  bowel resection as a , ORIF of multiple fractures of the left zygoma, zygomatic arch, nose fractures-2018 as per care everywhere   FAMILY HISTORY SOCIAL HISTORY    Reviewed and noncontributory   admits to smoking 1 pack/day, denies any alcohol or illicit drug abuse. Remote history of IV heroin   MEDICATIONS ALLERGIES    Reviewed with patient-is on Suboxone 8 mg 3 times daily, Nexium 40 mg daily, methocarbamol 5 mg 3 times daily, Zoloft 200 mg daily, gabapentin 800 mg 3 times daily.     Ceclor, amoxicillin       PAST MEDICAL, SURGICAL, FAMILY, and SOCIAL HISTORY         Past Medical History:   Diagnosis Date  Herniated intervertebral disc of lumbar spine     lumbar    Sciatica      Past Surgical History:   Procedure Laterality Date    ABDOMEN SURGERY      bowel surgery as a       Family History   Problem Relation Age of Onset    High Blood Pressure Father     Alcohol Abuse Father     Heart Disease Brother     Alcohol Abuse Maternal Grandmother     Alcohol Abuse Maternal Grandfather     Heart Disease Paternal Grandfather     Anxiety Disorder Brother      Family Hx of HTN  Family Hx as reviewed above, otherwise non-contributory  Social History     Socioeconomic History    Marital status: Single     Spouse name: None    Number of children: None    Years of education: None    Highest education level: None   Occupational History    None   Social Needs    Financial resource strain: None    Food insecurity     Worry: None     Inability: None    Transportation needs     Medical: None     Non-medical: None   Tobacco Use    Smoking status: Current Every Day Smoker     Packs/day: 1.00     Types: Cigarettes    Smokeless tobacco: Never Used   Substance and Sexual Activity    Alcohol use: No    Drug use: Yes     Types: Marijuana, IV     Comment: heroin  1.5 gram daily, pt states last use 4-5 days ago     Sexual activity: Yes     Partners: Female   Lifestyle    Physical activity     Days per week: None     Minutes per session: None    Stress: None   Relationships    Social connections     Talks on phone: None     Gets together: None     Attends [employer] service: None     Active member of club or organization: None     Attends meetings of clubs or organizations: None     Relationship status: None    Intimate partner violence     Fear of current or ex partner: None     Emotionally abused: None     Physically abused: None     Forced sexual activity: None   Other Topics Concern    None   Social History Narrative    None       MEDICATIONS   Medications Prior to Admission  Not in a hospital admission. Current Medications  No current facility-administered medications for this encounter. Current Outpatient Medications   Medication Sig Dispense Refill    sulfamethoxazole-trimethoprim (BACTRIM DS) 800-160 MG per tablet Take 1 tablet by mouth 2 times daily for 10 days 20 tablet 0    esomeprazole Magnesium (NEXIUM) 40 MG PACK Take 40 mg by mouth daily      hydrOXYzine (ATARAX) 50 MG tablet Take 50 mg by mouth every 4 hours as needed for Anxiety      methocarbamol (ROBAXIN) 500 MG tablet Take 500 mg by mouth 3 times daily      sertraline (ZOLOFT) 100 MG tablet Take 200 mg by mouth daily      gabapentin (NEURONTIN) 800 MG tablet Take 800 mg by mouth 4 times daily           Allergies  Allergies   Allergen Reactions    Banana Swelling    Ceclor [Cefaclor] Rash    Amoxicillin Diarrhea       REVIEW OF SYSTEMS   Within above limitations. 14 point review of systems reviewed. Pertinent positive or negative as per HPI or otherwise negative per 14 point systems review. PHYSICAL EXAM     Wt Readings from Last 3 Encounters:   11/28/20 170 lb (77.1 kg)   10/30/20 190 lb (86.2 kg)   08/18/18 180 lb (81.6 kg)       Blood pressure (!) 102/49, pulse 65, temperature 98.1 °F (36.7 °C), temperature source Oral, resp. rate 17, height 6' (1.829 m), weight 170 lb (77.1 kg), SpO2 99 %. GEN  -Awake, alert, NAD.   EYES   -PERRL. HENT  -MM are moist.   RESP  -LS CTA equal bilat, no wheezes, rales or rhonchi. no respiratory distress noted. C/V  -S1/S2 auscultated. RRR without appreciable M/R/G.   MS  -swelling near right elbow, erythematous, warm to touch. SKIN  -Normal coloration, warm, dry. NEURO  -CN 2-12 appear grossly intact, normal speech, no lateralizing weakness. PSYC  -Awake, alert, oriented x 3. Appropriate affect. LABS AND IMAGING     Results for Nicholas Groves (MRN [id number]) as of 11/28/2020 22:57   Ref.  Range 11/28/2020 15:00   Sodium Latest Ref Range: 135 - 145 MMOL/L 135 Potassium Latest Ref Range: 3.5 - 5.1 MMOL/L 5.0   Chloride Latest Ref Range: 99 - 110 mMol/L 97 (L)   CO2 Latest Ref Range: 21 - 32 MMOL/L 26   BUN Latest Ref Range: 6 - 23 MG/DL 7   Creatinine Latest Ref Range: 0.9 - 1.3 MG/DL 0.7 (L)   Anion Gap Latest Ref Range: 4 - 16  12   GFR Non- Latest Ref Range: >60 mL/min/1.73m2 >60   GFR African American Latest Ref Range: >60 mL/min/1.73m2 >60   Glucose Latest Ref Range: 70 - 99 MG/ (H)   Calcium Latest Ref Range: 8.3 - 10.6 MG/DL 9.3   Total Protein Latest Ref Range: 6.4 - 8.2 GM/DL 7.5   Troponin T Latest Ref Range: <0.01 NG/ML <0.010   Albumin Latest Ref Range: 3.4 - 5.0 GM/DL 3.9   Alk Phos Latest Ref Range: 40 - 129 IU/L 78   ALT Latest Ref Range: 10 - 40 U/L 6 (L)   AST Latest Ref Range: 15 - 37 IU/L 15   Bilirubin Latest Ref Range: 0.0 - 1.0 MG/DL 0.5     Results for Tejas Moe (MRN [id number]) as of 11/28/2020 22:57   Ref.  Range 11/28/2020 13:35   WBC Latest Ref Range: 4.0 - 10.5 K/CU MM 11.1 (H)   RBC Latest Ref Range: 4.6 - 6.2 M/CU MM 5.50   Hemoglobin Quant Latest Ref Range: 13.5 - 18.0 GM/DL 14.0   Hematocrit Latest Ref Range: 42 - 52 % 43.0   MCV Latest Ref Range: 78 - 100 FL 78.2   MCH Latest Ref Range: 27 - 31 PG 25.5 (L)   MCHC Latest Ref Range: 32.0 - 36.0 % 32.6   MPV Latest Ref Range: 7.5 - 11.1 FL 9.7   RDW Latest Ref Range: 11.7 - 14.9 % 15.9 (H)   Platelet Count Latest Ref Range: 140 - 440 K/CU    Lymphocyte % Latest Ref Range: 24 - 44 % 13.0 (L)   Monocytes % Latest Ref Range: 0 - 4 % 5.0 (H)   Eosinophils % Latest Ref Range: 0 - 3 % 0.8   Basophils % Latest Ref Range: 0 - 1 % 0.4   Lymphocytes Absolute Latest Units: K/CU MM 1.4   Monocytes Absolute Latest Units: K/CU MM 0.6   Eosinophils Absolute Latest Units: K/CU MM 0.1   Basophils Absolute Latest Units: K/CU MM 0.0   Differential Type Unknown AUTOMATED DIFFERENTIAL   Segs Relative Latest Ref Range: 36 - 66 % 80.3 (H)   Segs Absolute Latest Units: K/CU MM 8.9   Nucleated RBC % Latest Units: % 0.0   Immature Neutrophil % Latest Ref Range: 0 - 0.43 % 0.5 (H)   Total Immature Neutrophil Latest Units: K/CU MM 0.06   Total Nucleated RBC Latest Units: K/CU MM 0.0     Recent Imaging    Yosvany Quezada #[id number] ([id number]) (37 y.o. M) (Adm: 11/28/20)     [id number]          Imaging Results (last 7 days)            Procedure  Component  Value  Ref Range  Date/Time       CT RADIUS ULNA RIGHT W CONTRAST [[id number]]  Collected: 11/28/20 1748       Order Status: Completed  Updated: 11/28/20 1753       Narrative:         EXAMINATION:   CT OF THE FOREARM WITH CONTRAST 11/28/2020 4:37 pm     TECHNIQUE:   CT of the forearm was performed with the administration of intravenous   contrast.  Multiplanar reformatted images are provided for review. Dose   modulation, iterative reconstruction, and/or weight based adjustment of the   mA/kV was utilized to reduce the radiation dose to as low as reasonably   achievable. COMPARISON:   None. HISTORY   ORDERING SYSTEM PROVIDED HISTORY: IVDA, abscess   TECHNOLOGIST PROVIDED HISTORY:   Reason for exam:->IVDA, abscess   Reason for Exam: IVDA, abscess   Acuity: Acute   Type of Exam: Initial     FINDINGS:   Bones: No fracture or dislocation.  No osseous erosion. Soft Tissue:  A peripherally enhancing fluid collection is seen in the ulnar   aspect of the mid and proximal forearm measuring approximately 9.5 x 4.7 x   2.5 cm.  Extensive subcutaneous fat stranding.  No other drainable fluid   collection.  No soft tissue gas or foreign body.  The visualized tendons are   grossly intact. Joint:  No elbow effusion.  The joint spaces of the elbow are preserved.  The   partially visualized wrist is unremarkable.       Impression:         1. 9.5 cm abscess in the ulnar soft tissues of the mid and proximal forearm. 2. Extensive cellulitis.    3. No acute osseous abnormality.       XR CHEST PORTABLE [[id number]]  Collected: 11/28/20 1256       Order Status: Completed  Updated: 11/28/20 1318       Narrative:         EXAMINATION:   ONE XRAY VIEW OF THE CHEST     11/28/2020 12:35 pm     COMPARISON:   08/18/2018     HISTORY:   ORDERING SYSTEM PROVIDED HISTORY: sob   TECHNOLOGIST PROVIDED HISTORY:   Reason for exam:->sob   Reason for Exam: sob   Acuity: Acute   Type of Exam: Initial   Additional signs and symptoms: na   Relevant Medical/Surgical History: na     FINDINGS:   The cardiac silhouette and mediastinal contours are normal.  The lungs are   clear.  No parenchymal lung infiltrate.  No pleural effusion.  The visualized   osseous structures are unremarkable.       Impression:         1. No acute cardiopulmonary disease.           Relevant labs and imaging reviewed    ASSESSMENT AND PLAN     #. Abscess in right forearm:  -CT reported - 9.5 cm abscess in the ulnar soft tissues of the mid and proximal forearm, extensive cellulitis. No soft tissue gas or foreign body. General surgeon-Dr. Harmeet Rosales was consulted from ED.   -received vanc, cefepime in ER. Will continue.  -wound cultures-11/25/2020 grew Serratia Marcescens-sensitive to cefepime, Bactrim, levofloxacin, ciprofloxacin.  - NPO after midnight  -IV fluids. #. H/o IV drug use in the past:  -He is currently on Suboxone 3 times daily    #. ? Herniated intervertebral disc of lumbar spine:  -Patient on gabapentin 3 times daily, methocarbamol    #. Depression:  -Patient is on Zoloft 200 mg daily-hold due to QT prolongation on EKG. #. QT/QTc prolongation on EKG  -402/481  -hold sertraline. Even buprenorphine can cause QT prolongation. Continuing given his history of opioid abuse. DVT Prophylaxis: Hold Lovenox due to anticipated procedure in a.m. GI Prophylaxis: Not indicated  Code Status: FULL.       Case d/w ED physician    Dmitri Wan MD  Hospitalist, Internal Medicine  11/28/2020 at 6:58 PM

## 2020-11-28 NOTE — ED PROVIDER NOTES
As triage Physician Assistant, I performed a medical screening history and physical exam on this patient. HISTORY OF PRESENT ILLNESS  Graham Pastor is a 37 y.o. male  who presents with right arm redness and swelling and pain. Onset 4-5 days. Context is patient was seen 2 days ago in the emergency department at which time incision and drainage was done of abscess to right forearm region. Patient notes worsening redness and swelling since. Denies fevers. Does note chest tightness and possible shortness of breath to which she attributes to anxiety related to the arm symptoms. Patient has IVDA history-last use 10 days ago in region of arm symptoms. PHYSICAL EXAM  /80   Pulse 92   Temp 98.1 °F (36.7 °C) (Oral)   Resp 17   Ht 6' (1.829 m)   Wt 170 lb (77.1 kg)   SpO2 98%   BMI 23.06 kg/m²         On exam, the patient appears well-hydrated, well-nourished, and in no acute distress. Mucous membranes are moist. Breathing is unlabored. Skin is dry. There is evidence of cellulitis and recently incised abscess over right volar forearm. Mental status appears normal. Moves all extremities, and is without facial droop. Speech is clear. Laboratory evaluation, imaging ordered. Patient reports allergy to penicillin-diarrhea from this in the past.  Plan for vancomycin and Unasyn IV. I am aware of diarrhea from previous penicillin, however benefits outweigh risks at this point. Comment: Please note this report has been produced using speech recognition software and may contain errors related to that system including errors in grammar, punctuation, and spelling, as well as words and phrases that may be inappropriate. If there are any questions or concerns please feel free to contact the dictating provider for clarification.            Jersey Shayma  11/28/20 2987

## 2020-11-28 NOTE — ED NOTES
Rick chemistry (red, green), extra CBC from underside of patient's right wrist.      Ashli Mejia  11/28/20 0332

## 2020-11-29 ENCOUNTER — ANESTHESIA (OUTPATIENT)
Dept: OPERATING ROOM | Age: 35
DRG: 581 | End: 2020-11-29
Payer: OTHER GOVERNMENT

## 2020-11-29 ENCOUNTER — ANESTHESIA EVENT (OUTPATIENT)
Dept: OPERATING ROOM | Age: 35
DRG: 581 | End: 2020-11-29
Payer: OTHER GOVERNMENT

## 2020-11-29 VITALS
TEMPERATURE: 98.6 F | SYSTOLIC BLOOD PRESSURE: 106 MMHG | RESPIRATION RATE: 22 BRPM | OXYGEN SATURATION: 100 % | DIASTOLIC BLOOD PRESSURE: 69 MMHG

## 2020-11-29 LAB
ANION GAP SERPL CALCULATED.3IONS-SCNC: 13 MMOL/L (ref 4–16)
BASOPHILS ABSOLUTE: 0 K/CU MM
BASOPHILS RELATIVE PERCENT: 0.5 % (ref 0–1)
BUN BLDV-MCNC: 6 MG/DL (ref 6–23)
CALCIUM SERPL-MCNC: 9.1 MG/DL (ref 8.3–10.6)
CHLORIDE BLD-SCNC: 103 MMOL/L (ref 99–110)
CO2: 23 MMOL/L (ref 21–32)
CREAT SERPL-MCNC: 0.7 MG/DL (ref 0.9–1.3)
DIFFERENTIAL TYPE: ABNORMAL
EOSINOPHILS ABSOLUTE: 0.1 K/CU MM
EOSINOPHILS RELATIVE PERCENT: 1.7 % (ref 0–3)
GFR AFRICAN AMERICAN: >60 ML/MIN/1.73M2
GFR NON-AFRICAN AMERICAN: >60 ML/MIN/1.73M2
GLUCOSE BLD-MCNC: 96 MG/DL (ref 70–99)
HCT VFR BLD CALC: 39.1 % (ref 42–52)
HEMOGLOBIN: 12.8 GM/DL (ref 13.5–18)
IMMATURE NEUTROPHIL %: 0.4 % (ref 0–0.43)
INR BLD: 1.12 INDEX
LYMPHOCYTES ABSOLUTE: 1.6 K/CU MM
LYMPHOCYTES RELATIVE PERCENT: 20.5 % (ref 24–44)
MCH RBC QN AUTO: 25.6 PG (ref 27–31)
MCHC RBC AUTO-ENTMCNC: 32.7 % (ref 32–36)
MCV RBC AUTO: 78.2 FL (ref 78–100)
MONOCYTES ABSOLUTE: 0.5 K/CU MM
MONOCYTES RELATIVE PERCENT: 6 % (ref 0–4)
NUCLEATED RBC %: 0 %
PDW BLD-RTO: 15.9 % (ref 11.7–14.9)
PLATELET # BLD: 267 K/CU MM (ref 140–440)
PMV BLD AUTO: 9.6 FL (ref 7.5–11.1)
POTASSIUM SERPL-SCNC: 4.3 MMOL/L (ref 3.5–5.1)
PROTHROMBIN TIME: 13.6 SECONDS (ref 11.7–14.5)
RBC # BLD: 5 M/CU MM (ref 4.6–6.2)
SARS-COV-2, NAAT: NOT DETECTED
SEGMENTED NEUTROPHILS ABSOLUTE COUNT: 5.7 K/CU MM
SEGMENTED NEUTROPHILS RELATIVE PERCENT: 70.9 % (ref 36–66)
SODIUM BLD-SCNC: 139 MMOL/L (ref 135–145)
SOURCE: NORMAL
TOTAL IMMATURE NEUTOROPHIL: 0.03 K/CU MM
TOTAL NUCLEATED RBC: 0 K/CU MM
WBC # BLD: 8 K/CU MM (ref 4–10.5)

## 2020-11-29 PROCEDURE — 3600000012 HC SURGERY LEVEL 2 ADDTL 15MIN: Performed by: SURGERY

## 2020-11-29 PROCEDURE — 2580000003 HC RX 258: Performed by: NURSE PRACTITIONER

## 2020-11-29 PROCEDURE — 2709999900 HC NON-CHARGEABLE SUPPLY: Performed by: SURGERY

## 2020-11-29 PROCEDURE — 36415 COLL VENOUS BLD VENIPUNCTURE: CPT

## 2020-11-29 PROCEDURE — 2580000003 HC RX 258: Performed by: INTERNAL MEDICINE

## 2020-11-29 PROCEDURE — 87075 CULTR BACTERIA EXCEPT BLOOD: CPT

## 2020-11-29 PROCEDURE — 7100000001 HC PACU RECOVERY - ADDTL 15 MIN: Performed by: SURGERY

## 2020-11-29 PROCEDURE — 87070 CULTURE OTHR SPECIMN AEROBIC: CPT

## 2020-11-29 PROCEDURE — 99252 IP/OBS CONSLTJ NEW/EST SF 35: CPT | Performed by: SURGERY

## 2020-11-29 PROCEDURE — 80048 BASIC METABOLIC PNL TOTAL CA: CPT

## 2020-11-29 PROCEDURE — 3700000001 HC ADD 15 MINUTES (ANESTHESIA): Performed by: SURGERY

## 2020-11-29 PROCEDURE — 6370000000 HC RX 637 (ALT 250 FOR IP): Performed by: PHYSICIAN ASSISTANT

## 2020-11-29 PROCEDURE — 85610 PROTHROMBIN TIME: CPT

## 2020-11-29 PROCEDURE — U0002 COVID-19 LAB TEST NON-CDC: HCPCS

## 2020-11-29 PROCEDURE — 97605 NEG PRS WND THER DME<=50SQCM: CPT | Performed by: SURGERY

## 2020-11-29 PROCEDURE — 94761 N-INVAS EAR/PLS OXIMETRY MLT: CPT

## 2020-11-29 PROCEDURE — 10061 I&D ABSCESS COMP/MULTIPLE: CPT | Performed by: SURGERY

## 2020-11-29 PROCEDURE — 6360000002 HC RX W HCPCS: Performed by: INTERNAL MEDICINE

## 2020-11-29 PROCEDURE — 1200000000 HC SEMI PRIVATE

## 2020-11-29 PROCEDURE — 87081 CULTURE SCREEN ONLY: CPT

## 2020-11-29 PROCEDURE — 99999 PR OFFICE/OUTPT VISIT,PROCEDURE ONLY: CPT | Performed by: NURSE PRACTITIONER

## 2020-11-29 PROCEDURE — 7100000000 HC PACU RECOVERY - FIRST 15 MIN: Performed by: SURGERY

## 2020-11-29 PROCEDURE — 6360000002 HC RX W HCPCS: Performed by: NURSE PRACTITIONER

## 2020-11-29 PROCEDURE — 2500000003 HC RX 250 WO HCPCS: Performed by: NURSE ANESTHETIST, CERTIFIED REGISTERED

## 2020-11-29 PROCEDURE — 87077 CULTURE AEROBIC IDENTIFY: CPT

## 2020-11-29 PROCEDURE — 0J9G0ZZ DRAINAGE OF RIGHT LOWER ARM SUBCUTANEOUS TISSUE AND FASCIA, OPEN APPROACH: ICD-10-PCS | Performed by: SURGERY

## 2020-11-29 PROCEDURE — 6370000000 HC RX 637 (ALT 250 FOR IP): Performed by: INTERNAL MEDICINE

## 2020-11-29 PROCEDURE — 85025 COMPLETE CBC W/AUTO DIFF WBC: CPT

## 2020-11-29 PROCEDURE — 3700000000 HC ANESTHESIA ATTENDED CARE: Performed by: SURGERY

## 2020-11-29 PROCEDURE — 87205 SMEAR GRAM STAIN: CPT

## 2020-11-29 PROCEDURE — 6370000000 HC RX 637 (ALT 250 FOR IP): Performed by: NURSE PRACTITIONER

## 2020-11-29 PROCEDURE — 6360000002 HC RX W HCPCS: Performed by: NURSE ANESTHETIST, CERTIFIED REGISTERED

## 2020-11-29 PROCEDURE — 3600000002 HC SURGERY LEVEL 2 BASE: Performed by: SURGERY

## 2020-11-29 PROCEDURE — 93005 ELECTROCARDIOGRAM TRACING: CPT | Performed by: INTERNAL MEDICINE

## 2020-11-29 RX ORDER — LABETALOL HYDROCHLORIDE 5 MG/ML
5 INJECTION, SOLUTION INTRAVENOUS EVERY 10 MIN PRN
Status: DISCONTINUED | OUTPATIENT
Start: 2020-11-29 | End: 2020-11-29 | Stop reason: HOSPADM

## 2020-11-29 RX ORDER — ONDANSETRON 2 MG/ML
INJECTION INTRAMUSCULAR; INTRAVENOUS PRN
Status: DISCONTINUED | OUTPATIENT
Start: 2020-11-29 | End: 2020-11-29 | Stop reason: SDUPTHER

## 2020-11-29 RX ORDER — HYDRALAZINE HYDROCHLORIDE 20 MG/ML
5 INJECTION INTRAMUSCULAR; INTRAVENOUS EVERY 10 MIN PRN
Status: DISCONTINUED | OUTPATIENT
Start: 2020-11-29 | End: 2020-11-29 | Stop reason: HOSPADM

## 2020-11-29 RX ORDER — PROPOFOL 10 MG/ML
INJECTION, EMULSION INTRAVENOUS PRN
Status: DISCONTINUED | OUTPATIENT
Start: 2020-11-29 | End: 2020-11-29 | Stop reason: SDUPTHER

## 2020-11-29 RX ORDER — SERTRALINE HYDROCHLORIDE 100 MG/1
100 TABLET, FILM COATED ORAL DAILY
Status: DISCONTINUED | OUTPATIENT
Start: 2020-11-29 | End: 2020-11-29

## 2020-11-29 RX ORDER — SUFENTANIL CITRATE 50 UG/ML
INJECTION EPIDURAL; INTRAVENOUS PRN
Status: DISCONTINUED | OUTPATIENT
Start: 2020-11-29 | End: 2020-11-29 | Stop reason: SDUPTHER

## 2020-11-29 RX ORDER — LIDOCAINE HYDROCHLORIDE 20 MG/ML
INJECTION, SOLUTION EPIDURAL; INFILTRATION; INTRACAUDAL; PERINEURAL PRN
Status: DISCONTINUED | OUTPATIENT
Start: 2020-11-29 | End: 2020-11-29 | Stop reason: SDUPTHER

## 2020-11-29 RX ORDER — SERTRALINE HYDROCHLORIDE 100 MG/1
100 TABLET, FILM COATED ORAL DAILY
Status: DISCONTINUED | OUTPATIENT
Start: 2020-11-30 | End: 2020-11-29

## 2020-11-29 RX ORDER — FENTANYL CITRATE 50 UG/ML
INJECTION, SOLUTION INTRAMUSCULAR; INTRAVENOUS PRN
Status: DISCONTINUED | OUTPATIENT
Start: 2020-11-29 | End: 2020-11-29 | Stop reason: SDUPTHER

## 2020-11-29 RX ORDER — SERTRALINE HYDROCHLORIDE 100 MG/1
200 TABLET, FILM COATED ORAL DAILY
Status: DISCONTINUED | OUTPATIENT
Start: 2020-11-29 | End: 2020-12-01 | Stop reason: HOSPADM

## 2020-11-29 RX ORDER — DEXAMETHASONE SODIUM PHOSPHATE 4 MG/ML
INJECTION, SOLUTION INTRA-ARTICULAR; INTRALESIONAL; INTRAMUSCULAR; INTRAVENOUS; SOFT TISSUE PRN
Status: DISCONTINUED | OUTPATIENT
Start: 2020-11-29 | End: 2020-11-29 | Stop reason: SDUPTHER

## 2020-11-29 RX ORDER — PROMETHAZINE HYDROCHLORIDE 25 MG/ML
6.25 INJECTION, SOLUTION INTRAMUSCULAR; INTRAVENOUS
Status: DISCONTINUED | OUTPATIENT
Start: 2020-11-29 | End: 2020-11-29 | Stop reason: HOSPADM

## 2020-11-29 RX ORDER — FENTANYL CITRATE 50 UG/ML
25 INJECTION, SOLUTION INTRAMUSCULAR; INTRAVENOUS EVERY 5 MIN PRN
Status: DISCONTINUED | OUTPATIENT
Start: 2020-11-29 | End: 2020-11-29 | Stop reason: HOSPADM

## 2020-11-29 RX ADMIN — CEFEPIME 2 G: 2 INJECTION, POWDER, FOR SOLUTION INTRAVENOUS at 16:10

## 2020-11-29 RX ADMIN — VANCOMYCIN HYDROCHLORIDE 1000 MG: 1 INJECTION, POWDER, LYOPHILIZED, FOR SOLUTION INTRAVENOUS at 00:56

## 2020-11-29 RX ADMIN — VANCOMYCIN HYDROCHLORIDE 1000 MG: 1 INJECTION, POWDER, LYOPHILIZED, FOR SOLUTION INTRAVENOUS at 09:13

## 2020-11-29 RX ADMIN — SERTRALINE HYDROCHLORIDE 200 MG: 100 TABLET ORAL at 23:56

## 2020-11-29 RX ADMIN — SUFENTANIL CITRATE 5 MCG: 50 INJECTION EPIDURAL; INTRAVENOUS at 16:50

## 2020-11-29 RX ADMIN — PROPOFOL 100 MG: 10 INJECTION, EMULSION INTRAVENOUS at 16:05

## 2020-11-29 RX ADMIN — BUPRENORPHINE AND NALOXONE 1 TABLET: 8; 2 TABLET SUBLINGUAL at 21:28

## 2020-11-29 RX ADMIN — SODIUM CHLORIDE: 9 INJECTION, SOLUTION INTRAVENOUS at 17:01

## 2020-11-29 RX ADMIN — BUPRENORPHINE AND NALOXONE 1 TABLET: 8; 2 TABLET SUBLINGUAL at 09:00

## 2020-11-29 RX ADMIN — CEFEPIME HYDROCHLORIDE 1 G: 1 INJECTION, POWDER, FOR SOLUTION INTRAMUSCULAR; INTRAVENOUS at 05:56

## 2020-11-29 RX ADMIN — DEXAMETHASONE SODIUM PHOSPHATE 4 MG: 4 INJECTION, SOLUTION INTRAMUSCULAR; INTRAVENOUS at 16:27

## 2020-11-29 RX ADMIN — GABAPENTIN 800 MG: 400 CAPSULE ORAL at 09:00

## 2020-11-29 RX ADMIN — BUPRENORPHINE AND NALOXONE 1 TABLET: 8; 2 TABLET SUBLINGUAL at 13:44

## 2020-11-29 RX ADMIN — SUFENTANIL CITRATE 20 MCG: 50 INJECTION EPIDURAL; INTRAVENOUS at 16:14

## 2020-11-29 RX ADMIN — SUFENTANIL CITRATE 20 MCG: 50 INJECTION EPIDURAL; INTRAVENOUS at 16:19

## 2020-11-29 RX ADMIN — SODIUM CHLORIDE, PRESERVATIVE FREE 10 ML: 5 INJECTION INTRAVENOUS at 09:02

## 2020-11-29 RX ADMIN — SUFENTANIL CITRATE 20 MCG: 50 INJECTION EPIDURAL; INTRAVENOUS at 16:11

## 2020-11-29 RX ADMIN — SUFENTANIL CITRATE 20 MCG: 50 INJECTION EPIDURAL; INTRAVENOUS at 16:05

## 2020-11-29 RX ADMIN — FENTANYL CITRATE 100 MCG: 50 INJECTION INTRAMUSCULAR; INTRAVENOUS at 16:05

## 2020-11-29 RX ADMIN — GABAPENTIN 800 MG: 400 CAPSULE ORAL at 13:44

## 2020-11-29 RX ADMIN — SUFENTANIL CITRATE 5 MCG: 50 INJECTION EPIDURAL; INTRAVENOUS at 16:54

## 2020-11-29 RX ADMIN — LIDOCAINE HYDROCHLORIDE 100 MG: 20 INJECTION, SOLUTION EPIDURAL; INFILTRATION; INTRACAUDAL; PERINEURAL at 16:05

## 2020-11-29 RX ADMIN — SUFENTANIL CITRATE 5 MCG: 50 INJECTION EPIDURAL; INTRAVENOUS at 16:45

## 2020-11-29 RX ADMIN — METHOCARBAMOL TABLETS 500 MG: 500 TABLET, COATED ORAL at 21:29

## 2020-11-29 RX ADMIN — GABAPENTIN 800 MG: 400 CAPSULE ORAL at 21:28

## 2020-11-29 RX ADMIN — SODIUM CHLORIDE: 9 INJECTION, SOLUTION INTRAVENOUS at 07:44

## 2020-11-29 RX ADMIN — VANCOMYCIN HYDROCHLORIDE 1000 MG: 1 INJECTION, POWDER, LYOPHILIZED, FOR SOLUTION INTRAVENOUS at 17:06

## 2020-11-29 RX ADMIN — SUFENTANIL CITRATE 5 MCG: 50 INJECTION EPIDURAL; INTRAVENOUS at 16:24

## 2020-11-29 RX ADMIN — ONDANSETRON 4 MG: 2 INJECTION INTRAMUSCULAR; INTRAVENOUS at 16:27

## 2020-11-29 ASSESSMENT — PULMONARY FUNCTION TESTS
PIF_VALUE: 13
PIF_VALUE: 7
PIF_VALUE: 11
PIF_VALUE: 8
PIF_VALUE: 8
PIF_VALUE: 9
PIF_VALUE: 6
PIF_VALUE: 5
PIF_VALUE: 7
PIF_VALUE: 8
PIF_VALUE: 8
PIF_VALUE: 2
PIF_VALUE: 9
PIF_VALUE: 4
PIF_VALUE: 7
PIF_VALUE: 6
PIF_VALUE: 1
PIF_VALUE: 9
PIF_VALUE: 9

## 2020-11-29 ASSESSMENT — PAIN DESCRIPTION - ORIENTATION
ORIENTATION: RIGHT

## 2020-11-29 ASSESSMENT — PAIN DESCRIPTION - DESCRIPTORS
DESCRIPTORS: BURNING
DESCRIPTORS: BURNING
DESCRIPTORS: ACHING;CONSTANT
DESCRIPTORS: ACHING;CONSTANT

## 2020-11-29 ASSESSMENT — PAIN SCALES - GENERAL
PAINLEVEL_OUTOF10: 7
PAINLEVEL_OUTOF10: 5
PAINLEVEL_OUTOF10: 5
PAINLEVEL_OUTOF10: 4
PAINLEVEL_OUTOF10: 0
PAINLEVEL_OUTOF10: 5
PAINLEVEL_OUTOF10: 7
PAINLEVEL_OUTOF10: 5

## 2020-11-29 ASSESSMENT — PAIN DESCRIPTION - LOCATION
LOCATION: ARM

## 2020-11-29 ASSESSMENT — PAIN DESCRIPTION - FREQUENCY
FREQUENCY: CONTINUOUS
FREQUENCY: CONTINUOUS

## 2020-11-29 ASSESSMENT — PAIN DESCRIPTION - PAIN TYPE
TYPE: SURGICAL PAIN
TYPE: ACUTE PAIN
TYPE: ACUTE PAIN
TYPE: SURGICAL PAIN

## 2020-11-29 ASSESSMENT — PAIN DESCRIPTION - PROGRESSION: CLINICAL_PROGRESSION: GRADUALLY IMPROVING

## 2020-11-29 NOTE — OP NOTE
Operative Note      Patient: Brian Bernardo  YOB: 1985  MRN: [id number]    Date of Procedure: 11/29/2020    Pre-Op Diagnosis: Abscess right forearm    Post-Op Diagnosis: Same       Procedure(s):  ARM INCISION AND DRAINAGE AND WOUND VAC APPLICATION    Surgeon(s):  Dunia Harvey MD    First Assistant: KEVIN Pace-CNP  The use of a first assistant was necessary for the proper positioning, prepping, and draping of the patient, intraoperative retraction and suctioning for visualization, passing sutures and implants, stapling bowel and vessels using devises when necessary. Anesthesia: General    Detailed Description of Procedure:   Pt was brought to the OR and placed supine on the OR table. After induction of general anesthesia, the right arm was prepped and draped in the usual fashion. An incision was made over the fluctuant area and the underlying purulent drainage was cultured and suctioned from the abscess. The area was irrigated with the pulse lavage. Silver foam with silver aquacell was placed into the wound and covered with the wound vac dressing. The area drained was 10 x 5 cm.     Estimated Blood Loss (mL): less than 50 cc    Fluid: 3626 cc    Complications: Bleeding    Specimens:   ID Type Source Tests Collected by Time Destination   1 : right arm abscess culture Specimen Arm CULTURE, SURGICAL Dunia Harvey MD 11/29/2020 1614        Implants:  * No implants in log *      Drains:   Negative Pressure Wound Therapy Arm Right (Active)       Findings: Large abscess medial aspect inferior right arm        Electronically signed by Dunia Harvey MD on 11/29/2020 at 4:26 PM

## 2020-11-29 NOTE — PROGRESS NOTES
[facility]  consulted by Dr. Mar Ty for monitoring and adjustment. Indication for treatment: Abscess/cellulitis of right forearm  Goal trough: 10-15 mcg/mL     Pertinent Laboratory Values:   Temp Readings from Last 3 Encounters:   11/29/20 98.4 °F (36.9 °C) (Oral)   11/25/20 99.3 °F (37.4 °C) (Oral)   10/30/20 98.1 °F (36.7 °C)     Recent Labs     11/28/20  1335   WBC 11.1*   LACTATE 1.2     Recent Labs     11/28/20  1500   BUN 7   CREATININE 0.7*     Estimated Creatinine Clearance: 164 mL/min (A) (based on SCr of 0.7 mg/dL (L)). No intake or output data in the 24 hours ending 11/29/20 0219    Pertinent Cultures:  Date    Source    Results  11/25   Wound              Serratia Marcescens  11/28   Blood    Ordered    Vancomycin level:   TROUGH:  No results for input(s): VANCOTROUGH in the last 72 hours. RANDOM:  No results for input(s): VANCORANDOM in the last 72 hours. Assessment:  WBC and temperature: elevated WBC; Afebrile  SCr, BUN, and urine output: WNL  Hx of IVDA  Day(s) of therapy:1  Vancomycin level: To be collected    Plan:  Received Vancomycin 1500 mg x 1 in the ED  Start Vancomycin 1000 mg IVPB q8h  Pharmacy will continue to monitor patient and adjust therapy as indicated    VANCOMYCIN TROUGH SCHEDULED FOR 11/30/20 @0030    Thank you for the consult.   Max Mendoza RPh  11/29/2020 2:19 AM

## 2020-11-29 NOTE — ANESTHESIA PRE PROCEDURE
Department of Anesthesiology  Preprocedure Note       Name:  Watson Carrel   Age:  37 y.o.  :  1985                                          MRN:  [id number]         Date:  2020      Surgeon: Ann Olea):  Petrona Helton MD    Procedure: Procedure(s):  ARM INCISION AND DRAINAGE    Medications prior to admission:   Prior to Admission medications    Medication Sig Start Date End Date Taking? Authorizing Provider   buprenorphine-naloxone (SUBOXONE) 8-2 MG SUBL SL tablet Place 1 tablet under the tongue three times daily.    Yes Historical Provider, MD   sulfamethoxazole-trimethoprim (BACTRIM DS) 800-160 MG per tablet Take 1 tablet by mouth 2 times daily for 10 days 20 Yes Joselyn Pratt PA-C   esomeprazole Magnesium (NEXIUM) 40 MG PACK Take 40 mg by mouth daily   Yes Historical Provider, MD   hydrOXYzine (ATARAX) 50 MG tablet Take 50 mg by mouth every 4 hours as needed for Anxiety   Yes Historical Provider, MD   methocarbamol (ROBAXIN) 500 MG tablet Take 500 mg by mouth 3 times daily   Yes Historical Provider, MD   sertraline (ZOLOFT) 100 MG tablet Take 200 mg by mouth daily   Yes Historical Provider, MD   gabapentin (NEURONTIN) 800 MG tablet Take 800 mg by mouth 4 times daily   Yes Historical Provider, MD       Current medications:    Current Facility-Administered Medications   Medication Dose Route Frequency Provider Last Rate Last Dose    vancomycin 1000 mg IVPB in 250 mL D5W addavial  1,000 mg Intravenous Q8H Jesus Colmenares MD   Stopped at 20 1013    cefepime (MAXIPIME) 2 g IVPB minibag  2 g Intravenous Q12H KEVIN Valenzuela - TED        enoxaparin (LOVENOX) injection 40 mg  40 mg Subcutaneous Daily Lavina Lesch, MD        sodium chloride flush 0.9 % injection 10 mL  10 mL Intravenous 2 times per day Jesus Colmenares MD   10 mL at 20 0902    sodium chloride flush 0.9 % injection 10 mL  10 mL Intravenous PRN Jesus Colmenares MD       [facility] acetaminophen (TYLENOL) tablet 650 mg  650 mg Oral Q6H PRN Elsi Ruano MD        Or    acetaminophen (TYLENOL) suppository 650 mg  650 mg Rectal Q6H PRN Elsi Ruano MD        polyethylene glycol (GLYCOLAX) packet 17 g  17 g Oral Daily PRN Elsi Ruano MD        promethazine (PHENERGAN) tablet 12.5 mg  12.5 mg Oral Q6H PRN Elsi Ruano MD        Or    ondansetron (ZOFRAN) injection 4 mg  4 mg Intravenous Q6H PRN Elsi Ruano MD        0.9 % sodium chloride infusion   Intravenous Continuous Elsi Ruano  mL/hr at 20 0744      methocarbamol (ROBAXIN) tablet 500 mg  500 mg Oral TID Elsi Ruano MD   Stopped at 20 0901    buprenorphine-naloxone (SUBOXONE) 8-2 MG SL tablet 1 tablet  1 tablet Sublingual TID Elsi Ruano MD   1 tablet at 20 1344    nicotine (NICODERM CQ) 21 MG/24HR 1 patch  1 patch Transdermal Daily Elsi Ruano MD   1 patch at 20 1009    pantoprazole (PROTONIX) tablet 40 mg  40 mg Oral QAM AC Elsi Ruano MD   Stopped at 20 0528    gabapentin (NEURONTIN) capsule 800 mg  800 mg Oral TID Elsi Ruano MD   800 mg at 20 1344       Allergies: Allergies   Allergen Reactions    Banana Swelling    Ceclor [Cefaclor] Rash    Amoxicillin Diarrhea       Problem List:    Patient Active Problem List   Diagnosis Code    Drug addiction ([facility] F19.20    Opioid withdrawal ([facility] F11.23    Abscess of forearm, right L02.413       Past Medical History:        Diagnosis Date    Herniated intervertebral disc of lumbar spine     lumbar    Sciatica        Past Surgical History:        Procedure Laterality Date    ABDOMEN SURGERY      bowel surgery as a         Social History:    Social History     Tobacco Use    Smoking status: Current Every Day Smoker     Packs/day: 1.00     Types: Cigarettes    Smokeless tobacco: Never Used   Substance Use Topics    Alcohol use:  No Ready to quit: Not Answered  Counseling given: Not Answered      Vital Signs (Current):   Vitals:    11/29/20 0130 11/29/20 0600 11/29/20 0848 11/29/20 1528   BP: 116/73 117/66 (!) 88/56 109/82   Pulse: 59 64 69    Resp: 16 16 16    Temp: 98.4 °F (36.9 °C) 97.8 °F (36.6 °C) 97.9 °F (36.6 °C)    TempSrc: Oral Oral Oral    SpO2:  96% 97% 98%   Weight:       Height:                                                  BP Readings from Last 3 Encounters:   11/29/20 109/82   11/25/20 101/78   10/31/20 118/76       NPO Status: Time of last liquid consumption: 0900                        Time of last solid consumption: 0000                        Date of last liquid consumption: 11/29/20                        Date of last solid food consumption: 11/28/20    BMI:   Wt Readings from Last 3 Encounters:   11/28/20 170 lb (77.1 kg)   10/30/20 190 lb (86.2 kg)   08/18/18 180 lb (81.6 kg)     Body mass index is 23.06 kg/m². CBC:   Lab Results   Component Value Date    WBC 8.0 11/29/2020    RBC 5.00 11/29/2020    HGB 12.8 11/29/2020    HCT 39.1 11/29/2020    MCV 78.2 11/29/2020    RDW 15.9 11/29/2020     11/29/2020       CMP:   Lab Results   Component Value Date     11/29/2020    K 4.3 11/29/2020     11/29/2020    CO2 23 11/29/2020    BUN 6 11/29/2020    CREATININE 0.7 11/29/2020    GFRAA >60 11/29/2020    LABGLOM >60 11/29/2020    GLUCOSE 96 11/29/2020    PROT 7.5 11/28/2020    CALCIUM 9.1 11/29/2020    BILITOT 0.5 11/28/2020    ALKPHOS 78 11/28/2020    AST 15 11/28/2020    ALT 6 11/28/2020       POC Tests: No results for input(s): POCGLU, POCNA, POCK, POCCL, POCBUN, POCHEMO, POCHCT in the last 72 hours.     Coags:   Lab Results   Component Value Date    PROTIME 13.6 11/29/2020    INR 1.12 11/29/2020       HCG (If Applicable): No results found for: PREGTESTUR, PREGSERUM, HCG, HCGQUANT     ABGs: No results found for: PHART, PO2ART, UAZ8JPX, UGA5QUF, BEART, N4ORPJDX     Type & Screen (If Applicable):  No results found for: LABABO, LABRH    Drug/Infectious Status (If Applicable):  Lab Results   Component Value Date    HEPCAB REPEATEDLY REACTIVE 03/16/2018       COVID-19 Screening (If Applicable):   Lab Results   Component Value Date    COVID19 NOT DETECTED 11/29/2020         Anesthesia Evaluation  Patient summary reviewed and Nursing notes reviewed no history of anesthetic complications:   Airway: Mallampati: III  TM distance: >3 FB   Neck ROM: full  Mouth opening: > = 3 FB and < 3 FB Dental: normal exam         Pulmonary:normal exam                               Cardiovascular:  Exercise tolerance: good (>4 METS),           Rhythm: regular  Rate: normal                    Neuro/Psych:   (+) neuromuscular disease:, psychiatric history:            GI/Hepatic/Renal:             Endo/Other:                     Abdominal:           Vascular:                                        Anesthesia Plan      general     ASA 2       Induction: intravenous. MIPS: Prophylactic antiemetics administered. Anesthetic plan and risks discussed with patient. Plan discussed with CRNA.                   Steve Swartz MD   11/29/2020

## 2020-11-29 NOTE — CONSULTS
Aden Colvin MD        polyethylene glycol [facility]) packet 17 g  17 g Oral Daily PRN Aden Colvin MD        promethazine (PHENERGAN) tablet 12.5 mg  12.5 mg Oral Q6H PRN Aden Colvin MD        Or    ondansetron (ZOFRAN) injection 4 mg  4 mg Intravenous Q6H PRN Aden Colvin MD        0.9 % sodium chloride infusion   Intravenous Continuous Aden Colvin  mL/hr at 11/29/20 0744      methocarbamol (ROBAXIN) tablet 500 mg  500 mg Oral TID Aden Colvin MD   Stopped at 11/29/20 0901    buprenorphine-naloxone (SUBOXONE) 8-2 MG SL tablet 1 tablet  1 tablet Sublingual TID Aden Colvin MD   1 tablet at 11/29/20 0900    cefepime (MAXIPIME) 1 g IVPB minibag  1 g Intravenous Q8H Aden Colvin MD   Stopped at 11/29/20 0626    nicotine (NICODERM CQ) 21 MG/24HR 1 patch  1 patch Transdermal Daily Aden Colvin MD   1 patch at 11/28/20 2103    pantoprazole (PROTONIX) tablet 40 mg  40 mg Oral QAM AC Aden Colvin MD   Stopped at 11/29/20 0528    gabapentin (NEURONTIN) capsule 800 mg  800 mg Oral TID Aden Colvin MD   800 mg at 11/29/20 0900       Allergies:  Banana;  Ceclor [cefaclor]; and Amoxicillin    Social History:   Social History     Socioeconomic History    Marital status: Single     Spouse name: None    Number of children: None    Years of education: None    Highest education level: None   Occupational History    None   Social Needs    Financial resource strain: None    Food insecurity     Worry: None     Inability: None    Transportation needs     Medical: None     Non-medical: None   Tobacco Use    Smoking status: Current Every Day Smoker     Packs/day: 1.00     Types: Cigarettes    Smokeless tobacco: Never Used   Substance and Sexual Activity    Alcohol use: No    Drug use: Yes     Types: Marijuana, IV     Comment: heroin  1.5 gram daily, pt states last use 4-5 days ago     Sexual activity: Yes     Partners: Female   Lifestyle    Physical activity     Days per week: None     Minutes per session: None    Stress: None   Relationships    Social connections     Talks on phone: None     Gets together: None     Attends Ca[employer] service: None     Active member of club or organization: None     Attends meetings of clubs or organizations: None     Relationship status: None    Intimate partner violence     Fear of current or ex partner: None     Emotionally abused: None     Physically abused: None     Forced sexual activity: None   Other Topics Concern    None   Social History Narrative    None       Family History:   Family History   Problem Relation Age of Onset    High Blood Pressure Father     Alcohol Abuse Father     Heart Disease Brother     Alcohol Abuse Maternal Grandmother     Alcohol Abuse Maternal Grandfather     Heart Disease Paternal Grandfather     Anxiety Disorder Brother        REVIEW OF SYSTEMS:    Pertinent items noted in HPI    PHYSICAL EXAM:    VITALS:  BP (!) 88/56   Pulse 69   Temp 97.9 °F (36.6 °C) (Oral)   Resp 16   Ht 6' (1.829 m)   Wt 170 lb (77.1 kg)   SpO2 97%   BMI 23.06 kg/m²   CONSTITUTIONAL:  awake, alert, no apparent distress  ENT:  normocepalic, without obvious abnormality  NECK:  supple, symmetrical, trachea midline  LUNGS:  clear to auscultation  CARDIOVASCULAR:  regular rate and rhythm  MUSCULOSKELETAL:  0+ pitting edema lower extremities  SKIN:  Right forearm with significant swelling, erythema and fluctuance.   NEUROLOGIC:  Mental Status Exam:  Level of Alertness:   awake  Orientation:   person, place, time    DATA:    Lab Results   Component Value Date    WBC 8.0 11/29/2020    HGB 12.8 (L) 11/29/2020    HCT 39.1 (L) 11/29/2020     11/29/2020     11/29/2020    K 4.3 11/29/2020     11/29/2020    CO2 23 11/29/2020    BUN 6 11/29/2020    CREATININE 0.7 (L) 11/29/2020    GLUCOSE 96 11/29/2020    CALCIUM 9.1 11/29/2020    PROT 7.5 11/28/2020    BILITOT 0.5 11/28/2020    AST 15 11/28/2020    ALT 6 (L) 11/28/2020    ALKPHOS 78 11/28/2020    INR 1.12 11/29/2020    GLUF 82 09/19/2016       Imaging:   Ct Radius Ulna Right W Contrast    Result Date: 11/28/2020  EXAMINATION: CT OF THE FOREARM WITH CONTRAST 11/28/2020 4:37 pm TECHNIQUE: CT of the forearm was performed with the administration of intravenous contrast.  Multiplanar reformatted images are provided for review. Dose modulation, iterative reconstruction, and/or weight based adjustment of the mA/kV was utilized to reduce the radiation dose to as low as reasonably achievable. COMPARISON: None. HISTORY ORDERING SYSTEM PROVIDED HISTORY: IVDA, abscess TECHNOLOGIST PROVIDED HISTORY: Reason for exam:->IVDA, abscess Reason for Exam: IVDA, abscess Acuity: Acute Type of Exam: Initial FINDINGS: Bones: No fracture or dislocation. No osseous erosion. Soft Tissue:  A peripherally enhancing fluid collection is seen in the ulnar aspect of the mid and proximal forearm measuring approximately 9.5 x 4.7 x 2.5 cm. Extensive subcutaneous fat stranding. No other drainable fluid collection. No soft tissue gas or foreign body. The visualized tendons are grossly intact. Joint:  No elbow effusion. The joint spaces of the elbow are preserved. The partially visualized wrist is unremarkable. 1. 9.5 cm abscess in the ulnar soft tissues of the mid and proximal forearm. 2. Extensive cellulitis. 3. No acute osseous abnormality. Xr Chest Portable    Result Date: 11/28/2020  EXAMINATION: ONE XRAY VIEW OF THE CHEST 11/28/2020 12:35 pm COMPARISON: 08/18/2018 HISTORY: ORDERING SYSTEM PROVIDED HISTORY: sob TECHNOLOGIST PROVIDED HISTORY: Reason for exam:->sob Reason for Exam: sob Acuity: Acute Type of Exam: Initial Additional signs and symptoms: na Relevant Medical/Surgical History: na FINDINGS: The cardiac silhouette and mediastinal contours are normal.  The lungs are clear. No parenchymal lung infiltrate. No pleural effusion.   The visualized osseous structures are unremarkable. 1. No acute cardiopulmonary disease. Cta Upper Extremity Left W Contrast    Result Date: 10/30/2020  EXAMINATION: CTA OF THE LEFT UPPER EXTREMITY WITH CONTRAST 10/30/2020 10:19 pm TECHNIQUE: CTA of the left upper extremity was performed with the administration of intravenous contrast. Multiplanar reformatted images are provided for review. MIP images are provided for review. Dose modulation, iterative reconstruction, and/or weight based adjustment of the mA/kV was utilized to reduce the radiation dose to as low as reasonably achievable. COMPARISON: None. HISTORY ORDERING SYSTEM PROVIDED HISTORY: redness and swelling in right forearm. IV drug user. Also reports injury to the forearm after being struck by tree branch. TECHNOLOGIST PROVIDED HISTORY: Reason for exam:->redness and swelling in right forearm. IV drug user. Also reports injury to the forearm after being struck by tree branch. Reason for Exam: lt. proximal forearm swelling redness. hit with tree branch four days ago. iv drug abuse. Acuity: Acute Mechanism of Injury: hit with tree branch Relevant Medical/Surgical History: 80 ml isovue 370 used. FINDINGS: Soft tissues: Complex irregular peripherally enhancing collection within the anterior compartment overlying the proximal ulna compatible with an abscess measuring 6.9 x 4.4 cm in maximum axial dimension and 10.6 cm in long axis. There may be some extension to the adjacent lateral compartment. Within the anterior compartment, this collection involves the flexor pollicis longus and carpal radialis muscles. Diffuse skin thickening and subcutaneous fat stranding about the forearm and elbow. No soft tissue gas. Reactive left axillary lymph nodes. Vascular: Included arterial and venous structures opacify with contrast and are patent. Bones: Bones are intact and in anatomic alignment. No osseous destruction, erosion or periosteal reaction.  Joints: No joint effusion. Other: Included lungs are clear. No acute findings along the included neck, face and head on limited evaluation. 10 cm abscess within the left upper extremity anterior compartment overlying the proximal ulna. Surrounding soft tissue edema/cellulitis. No soft tissue gas, elbow joint effusion or evidence of osteomyelitis. Reactive left axillary lymphadenopathy. Pertinent laboratory and imaging studies were personally reviewed if available. IMPRESSION:    Erendira Robert is a 37 y.o. male with abscess of right forearm    Patient Active Problem List    Diagnosis Date Noted    Abscess of forearm, right 11/28/2020    Opioid withdrawal ([facility] 03/16/2018    Drug addiction ([facility] 10/26/2016     Visit Diagnoses:  1. Abscess of forearm, right    2. Failure of outpatient treatment    3. IV drug abuse ([facility]    4. Chest pain, unspecified type      PLAN:  · Abscess of right forearm - worsening redness and swelling of abscess. Recent IVDA. Will plan I&D in OR. The risks, benefits and alternatives to the planned procedure were discussed. Patient expressed an understanding and is willing to proceed.   · Thank you for the consultation and the opportunity to care for Erendira Robert    Patient and plan of care discussed with Dr. Kizzy Burnett MD.    Electronically signed by KEVIN Humphrey CNP, 11/29/2020, 9:10 AM

## 2020-11-29 NOTE — PLAN OF CARE
Problem: Pain:  Goal: Pain level will decrease  Description: Pain level will decrease  Outcome: Ongoing  Goal: Control of acute pain  Description: Control of acute pain  Outcome: Ongoing  Goal: Control of chronic pain  Description: Control of chronic pain  Outcome: Ongoing     Problem: Physical Regulation:  Goal: Will remain free from infection  Description: Will remain free from infection  Outcome: Ongoing

## 2020-11-29 NOTE — PROGRESS NOTES
Hospitalist Progress Note      Name:  Jorge Gaxiola /Age/Sex: 1985  (37 y.o. male)   MRN & CSN:  [id number] & [id number] Admission Date/Time: 2020 12:52 PM   Location:  [address] [place] [address] PCP: No primary care provider on file. Hospital Day: 2    Assessment and Plan:   Jorge Gaxiola is a 37 y.o.  male  who presents with  Right arm swelling and pain    · Right forearm abscess  -Failed OP treatment  -Mild Leucocytosis  -CT reported - 9.5 cm abscess in the ulnar soft tissues of the mid and proximal forearm, extensive cellulitis. No soft tissue gas or foreign body.  -Check MRSA  -Recent wound cultures 2020 grow serratia Marcescens-sensitive to cefepime  -IV fluids, analgesics  -On IV vancomycin and cefepime  -Surgery on board, plans for I&D in the OR    Chronic medical conditions  -IVDU, on suboxone  -Chronic back pain, on gabapentin, methocarbamol, continue home medication  -Depression, resume home medications      Diet Diet NPO, After Midnight   DVT Prophylaxis [x] Lovenox, []  Heparin, [] SCDs, []No VTE prophylaxis, patient ambulating   GI Prophylaxis [] PPI, [] H2 Blocker, [] No GI prophylaxis, patient is receiving diet/Tube Feeds   Code Status Full Code   Disposition Patient requires continued admission due to   MDM [] Low, [] Moderate,[]  High  Patient's risk as above due to      History of Present Illness:     Pt S&E. No acute overnight events, no worries at this time  10-14 point ROS reviewed negative, unless as noted above    Objective: Intake/Output Summary (Last 24 hours) at 2020 1410  Last data filed at 2020 0902  Gross per 24 hour   Intake 10 ml   Output 600 ml   Net -590 ml      Vitals:   Vitals:    20 0848   BP: (!) 88/56   Pulse: 69   Resp: 16   Temp: 97.9 °F (36.6 °C)   SpO2: 97%     Physical Exam:    GEN Awake male, sitting upright in bed in no apparent distress. Appears given age. EYES Pupils are equally round.   No scleral erythema, discharge, or conjunctivitis. HENT Mucous membranes are moist.   NECK No apparent thyromegaly or masses. RESP Clear to auscultation, no wheezes, rales or rhonchi. Symmetric chest movement while on room air. CARDIO/VASC S1/S2 auscultated. Regular rate without appreciable murmurs, rubs, or gallops. Peripheral pulses equal bilaterally and palpable. No peripheral edema. GI Abdomen is soft without significant tenderness, masses, or guarding. Bowel sounds are normoactive. Rectal exam deferred.  Parikh catheter is not present. HEME/LYMPH No petechiae or ecchymoses. MSK No gross joint deformities. Spontaneous movement of all extremities. Right arm swelling, erythematous, edematous, painful to touch  SKIN Normal coloration, warm, dry. NEURO Cranial nerves appear grossly intact, normal speech, no lateralizing weakness. PSYCH Awake, alert, oriented x 4. Affect appropriate.     Medications:   Medications:    vancomycin  1,000 mg Intravenous Q8H    cefepime  2 g Intravenous Q12H    sodium chloride flush  10 mL Intravenous 2 times per day    methocarbamol  500 mg Oral TID    buprenorphine-naloxone  1 tablet Sublingual TID    nicotine  1 patch Transdermal Daily    pantoprazole  40 mg Oral QAM AC    gabapentin  800 mg Oral TID      Infusions:    sodium chloride 125 mL/hr at 11/29/20 0744     PRN Meds: sodium chloride flush, 10 mL, PRN  acetaminophen, 650 mg, Q6H PRN    Or  acetaminophen, 650 mg, Q6H PRN  polyethylene glycol, 17 g, Daily PRN  promethazine, 12.5 mg, Q6H PRN    Or  ondansetron, 4 mg, Q6H PRN        Data    Recent Labs     11/28/20  1335 11/29/20  0530   WBC 11.1* 8.0   HGB 14.0 12.8*   HCT 43.0 39.1*    267      Recent Labs     11/28/20  1500 11/29/20  0530    139   K 5.0 4.3   CL 97* 103   CO2 26 23   BUN 7 6   CREATININE 0.7* 0.7*     Recent Labs     11/28/20  1500   AST 15   ALT 6*   BILITOT 0.5   ALKPHOS 78     Recent Labs     11/29/20  0530   INR 1.12     No results for input(s): CKTOTAL, CKMB, CKMBINDEX, TROPONINI in the last 72 hours.         Electronically signed by Lico Chu MD on 11/29/2020 at 2:10 PM

## 2020-11-30 LAB
ANION GAP SERPL CALCULATED.3IONS-SCNC: 11 MMOL/L (ref 4–16)
BASOPHILS ABSOLUTE: 0 K/CU MM
BASOPHILS RELATIVE PERCENT: 0.4 % (ref 0–1)
BUN BLDV-MCNC: 8 MG/DL (ref 6–23)
CALCIUM SERPL-MCNC: 8.8 MG/DL (ref 8.3–10.6)
CHLORIDE BLD-SCNC: 103 MMOL/L (ref 99–110)
CO2: 26 MMOL/L (ref 21–32)
CREAT SERPL-MCNC: 0.6 MG/DL (ref 0.9–1.3)
CULTURE: ABNORMAL
CULTURE: ABNORMAL
DIFFERENTIAL TYPE: ABNORMAL
DOSE AMOUNT: NORMAL
DOSE TIME: NORMAL
EKG ATRIAL RATE: 70 BPM
EKG ATRIAL RATE: 86 BPM
EKG DIAGNOSIS: NORMAL
EKG DIAGNOSIS: NORMAL
EKG P AXIS: 58 DEGREES
EKG P AXIS: 74 DEGREES
EKG P-R INTERVAL: 148 MS
EKG P-R INTERVAL: 154 MS
EKG Q-T INTERVAL: 402 MS
EKG Q-T INTERVAL: 408 MS
EKG QRS DURATION: 94 MS
EKG QRS DURATION: 94 MS
EKG QTC CALCULATION (BAZETT): 440 MS
EKG QTC CALCULATION (BAZETT): 481 MS
EKG R AXIS: 64 DEGREES
EKG R AXIS: 77 DEGREES
EKG T AXIS: 58 DEGREES
EKG T AXIS: 67 DEGREES
EKG VENTRICULAR RATE: 70 BPM
EKG VENTRICULAR RATE: 86 BPM
EOSINOPHILS ABSOLUTE: 0 K/CU MM
EOSINOPHILS RELATIVE PERCENT: 0.3 % (ref 0–3)
GFR AFRICAN AMERICAN: >60 ML/MIN/1.73M2
GFR NON-AFRICAN AMERICAN: >60 ML/MIN/1.73M2
GLUCOSE BLD-MCNC: 133 MG/DL (ref 70–99)
HCT VFR BLD CALC: 37.7 % (ref 42–52)
HEMOGLOBIN: 11.7 GM/DL (ref 13.5–18)
IMMATURE NEUTROPHIL %: 0.4 % (ref 0–0.43)
LYMPHOCYTES ABSOLUTE: 1.2 K/CU MM
LYMPHOCYTES RELATIVE PERCENT: 15.5 % (ref 24–44)
Lab: ABNORMAL
MCH RBC QN AUTO: 25 PG (ref 27–31)
MCHC RBC AUTO-ENTMCNC: 31 % (ref 32–36)
MCV RBC AUTO: 80.6 FL (ref 78–100)
MONOCYTES ABSOLUTE: 0.3 K/CU MM
MONOCYTES RELATIVE PERCENT: 3.6 % (ref 0–4)
NUCLEATED RBC %: 0 %
PDW BLD-RTO: 15.8 % (ref 11.7–14.9)
PLATELET # BLD: 268 K/CU MM (ref 140–440)
PMV BLD AUTO: 10.1 FL (ref 7.5–11.1)
POTASSIUM SERPL-SCNC: 4.3 MMOL/L (ref 3.5–5.1)
RBC # BLD: 4.68 M/CU MM (ref 4.6–6.2)
SEGMENTED NEUTROPHILS ABSOLUTE COUNT: 6.3 K/CU MM
SEGMENTED NEUTROPHILS RELATIVE PERCENT: 79.8 % (ref 36–66)
SODIUM BLD-SCNC: 140 MMOL/L (ref 135–145)
SPECIMEN: ABNORMAL
TOTAL IMMATURE NEUTOROPHIL: 0.03 K/CU MM
TOTAL NUCLEATED RBC: 0 K/CU MM
VANCOMYCIN TROUGH: 11.9 UG/ML (ref 10–20)
WBC # BLD: 7.9 K/CU MM (ref 4–10.5)

## 2020-11-30 PROCEDURE — 94761 N-INVAS EAR/PLS OXIMETRY MLT: CPT

## 2020-11-30 PROCEDURE — 6370000000 HC RX 637 (ALT 250 FOR IP): Performed by: NURSE PRACTITIONER

## 2020-11-30 PROCEDURE — 93010 ELECTROCARDIOGRAM REPORT: CPT | Performed by: INTERNAL MEDICINE

## 2020-11-30 PROCEDURE — 1200000000 HC SEMI PRIVATE

## 2020-11-30 PROCEDURE — 97605 NEG PRS WND THER DME<=50SQCM: CPT

## 2020-11-30 PROCEDURE — 80048 BASIC METABOLIC PNL TOTAL CA: CPT

## 2020-11-30 PROCEDURE — 2580000003 HC RX 258: Performed by: NURSE PRACTITIONER

## 2020-11-30 PROCEDURE — 85025 COMPLETE CBC W/AUTO DIFF WBC: CPT

## 2020-11-30 PROCEDURE — 6360000002 HC RX W HCPCS: Performed by: NURSE PRACTITIONER

## 2020-11-30 PROCEDURE — 36415 COLL VENOUS BLD VENIPUNCTURE: CPT

## 2020-11-30 PROCEDURE — 6370000000 HC RX 637 (ALT 250 FOR IP): Performed by: PHYSICIAN ASSISTANT

## 2020-11-30 PROCEDURE — 80202 ASSAY OF VANCOMYCIN: CPT

## 2020-11-30 PROCEDURE — 99024 POSTOP FOLLOW-UP VISIT: CPT | Performed by: NURSE PRACTITIONER

## 2020-11-30 RX ADMIN — METHOCARBAMOL TABLETS 500 MG: 500 TABLET, COATED ORAL at 14:19

## 2020-11-30 RX ADMIN — VANCOMYCIN HYDROCHLORIDE 1000 MG: 1 INJECTION, POWDER, LYOPHILIZED, FOR SOLUTION INTRAVENOUS at 17:31

## 2020-11-30 RX ADMIN — SODIUM CHLORIDE: 9 INJECTION, SOLUTION INTRAVENOUS at 09:32

## 2020-11-30 RX ADMIN — ACETAMINOPHEN 650 MG: 325 TABLET ORAL at 12:10

## 2020-11-30 RX ADMIN — BUPRENORPHINE AND NALOXONE 1 TABLET: 8; 2 TABLET SUBLINGUAL at 14:19

## 2020-11-30 RX ADMIN — METHOCARBAMOL TABLETS 500 MG: 500 TABLET, COATED ORAL at 20:20

## 2020-11-30 RX ADMIN — SODIUM CHLORIDE: 9 INJECTION, SOLUTION INTRAVENOUS at 17:00

## 2020-11-30 RX ADMIN — SERTRALINE HYDROCHLORIDE 200 MG: 100 TABLET ORAL at 08:19

## 2020-11-30 RX ADMIN — VANCOMYCIN HYDROCHLORIDE 1000 MG: 1 INJECTION, POWDER, LYOPHILIZED, FOR SOLUTION INTRAVENOUS at 01:24

## 2020-11-30 RX ADMIN — GABAPENTIN 800 MG: 400 CAPSULE ORAL at 20:20

## 2020-11-30 RX ADMIN — CEFEPIME 2 G: 2 INJECTION, POWDER, FOR SOLUTION INTRAVENOUS at 05:08

## 2020-11-30 RX ADMIN — GABAPENTIN 800 MG: 400 CAPSULE ORAL at 14:19

## 2020-11-30 RX ADMIN — METHOCARBAMOL TABLETS 500 MG: 500 TABLET, COATED ORAL at 08:19

## 2020-11-30 RX ADMIN — BUPRENORPHINE AND NALOXONE 1 TABLET: 8; 2 TABLET SUBLINGUAL at 08:19

## 2020-11-30 RX ADMIN — BUPRENORPHINE AND NALOXONE 1 TABLET: 8; 2 TABLET SUBLINGUAL at 20:19

## 2020-11-30 RX ADMIN — GABAPENTIN 800 MG: 400 CAPSULE ORAL at 08:19

## 2020-11-30 RX ADMIN — PANTOPRAZOLE SODIUM 40 MG: 40 TABLET, DELAYED RELEASE ORAL at 08:22

## 2020-11-30 RX ADMIN — VANCOMYCIN HYDROCHLORIDE 1000 MG: 1 INJECTION, POWDER, LYOPHILIZED, FOR SOLUTION INTRAVENOUS at 08:19

## 2020-11-30 RX ADMIN — CEFEPIME 2 G: 2 INJECTION, POWDER, FOR SOLUTION INTRAVENOUS at 17:00

## 2020-11-30 ASSESSMENT — PAIN SCALES - GENERAL
PAINLEVEL_OUTOF10: 3
PAINLEVEL_OUTOF10: 5
PAINLEVEL_OUTOF10: 5
PAINLEVEL_OUTOF10: 3
PAINLEVEL_OUTOF10: 4
PAINLEVEL_OUTOF10: 3

## 2020-11-30 NOTE — PROGRESS NOTES
11/30/20 0817   BP: (!) 142/76   Pulse: 57   Resp: 16   Temp: 98 °F (36.7 °C)   SpO2: 99%     Physical Exam:    GEN Awake male, sitting upright in bed in no apparent distress. Appears given age. EYES Pupils are equally round. No scleral erythema, discharge, or conjunctivitis. HENT Mucous membranes are moist.   NECK No apparent thyromegaly or masses. RESP Clear to auscultation, no wheezes, rales or rhonchi. Symmetric chest movement while on room air. CARDIO/VASC S1/S2 auscultated. Regular rate without appreciable murmurs, rubs, or gallops. Peripheral pulses equal bilaterally and palpable. No peripheral edema. GI Abdomen is soft without significant tenderness, masses, or guarding. Bowel sounds are normoactive. Rectal exam deferred.  Parikh catheter is not present. HEME/LYMPH No petechiae or ecchymoses. MSK No gross joint deformities. Spontaneous movement of all extremities, right elbow, clean dressing, wound VAC in situ  SKIN Normal coloration, warm, dry. NEURO Cranial nerves appear grossly intact, normal speech, no lateralizing weakness. PSYCH Awake, alert, oriented x 4. Affect appropriate.     Medications:   Medications:    vancomycin  1,000 mg Intravenous Q8H    cefepime  2 g Intravenous Q12H    enoxaparin  40 mg Subcutaneous Daily    sertraline  200 mg Oral Daily    sodium chloride flush  10 mL Intravenous 2 times per day    methocarbamol  500 mg Oral TID    buprenorphine-naloxone  1 tablet Sublingual TID    nicotine  1 patch Transdermal Daily    pantoprazole  40 mg Oral QAM AC    gabapentin  800 mg Oral TID      Infusions:    sodium chloride 125 mL/hr at 11/30/20 0932     PRN Meds: sodium chloride flush, 10 mL, PRN  acetaminophen, 650 mg, Q6H PRN    Or  acetaminophen, 650 mg, Q6H PRN  polyethylene glycol, 17 g, Daily PRN  promethazine, 12.5 mg, Q6H PRN    Or  ondansetron, 4 mg, Q6H PRN        Data    Recent Labs     11/28/20  1335 11/29/20  0530 11/30/20  0030   WBC 11.1* 8.0 7.9   HGB 14.0 12.8* 11.7*   HCT 43.0 39.1* 37.7*    267 268      Recent Labs     11/28/20  1500 11/29/20  0530 11/30/20  0030    139 140   K 5.0 4.3 4.3   CL 97* 103 103   CO2 26 23 26   BUN 7 6 8   CREATININE 0.7* 0.7* 0.6*     Recent Labs     11/28/20  1500   AST 15   ALT 6*   BILITOT 0.5   ALKPHOS 78     Recent Labs     11/29/20  0530   INR 1.12     No results for input(s): CKTOTAL, CKMB, CKMBINDEX, TROPONINI in the last 72 hours.         Electronically signed by Abdiel Luong MD on 11/30/2020 at 2:18 PM

## 2020-11-30 NOTE — PROGRESS NOTES
GENERAL SURGERY INPATIENT POST-OP NOTE  [facility]) Physicians    PATIENT: Geneva Conroy, 37 y.o., male, MRN: [id number]    Hospital Day:  LOS: 2 days , Post-Op Day: 1 Day Post-Op    Procedure(s): I&D of right forearm with wound vac placement    Patient was stable overnight. Chart reviewed. The patient feels better. Pain is moderately controlled with current medications. Nausea negative, vomitting negative.      Objective:    Vitals: BP (!) 142/76   Pulse 57   Temp 98 °F (36.7 °C) (Oral)   Resp 16   Ht 6' (1.829 m)   Wt 170 lb (77.1 kg)   SpO2 99%   BMI 23.06 kg/m²     I/O: 11/29 0701 - 11/30 0700  In: 110 [I.V.:110]  Out: 2155 [Urine:2100; Drains:5]    IV Fluids: sodium chloride Last Rate: 125 mL/hr at 11/29/20 1701    Scheduled Meds:   vancomycin, 1,000 mg, Intravenous, Q8H    cefepime, 2 g, Intravenous, Q12H    enoxaparin, 40 mg, Subcutaneous, Daily    sertraline, 200 mg, Oral, Daily    sodium chloride flush, 10 mL, Intravenous, 2 times per day    methocarbamol, 500 mg, Oral, TID    buprenorphine-naloxone, 1 tablet, Sublingual, TID    nicotine, 1 patch, Transdermal, Daily    pantoprazole, 40 mg, Oral, QAM AC    gabapentin, 800 mg, Oral, TID    Physical Exam:  General appearance - alert, well appearing, and in no distress  Chest - clear to auscultation, no wheezes, rales or rhonchi, symmetric air entry  Cardiovascular - normal rate and regular rhythm  Incision -  Wound vac in place, erythema improved  Neurological - Alert and oriented, Normal speech and No focal findings or movement disorder noted  Musculoskeletal -Full ROM times 4 extremities, No peripheral edema     Labs/Imaging Results:   Recent Results (from the past 24 hour(s))   COVID-19    Collection Time: 11/29/20 10:15 AM    Specimen: Nasopharyngeal Swab   Result Value Ref Range    Source THROAT     SARS-CoV-2, NAAT NOT DETECTED    Vancomycin, trough    Collection Time: 11/30/20 12:30 AM   Result Value Ref Range    Vancomycin Tr 11.9 10 - 20 UG/ML    DOSE AMOUNT DOSE AMT. GIVEN - 1000 mg     DOSE TIME DOSE TIME GIVEN - 0100    CBC auto differential    Collection Time: 11/30/20 12:30 AM   Result Value Ref Range    WBC 7.9 4.0 - 10.5 K/CU MM    RBC 4.68 4.6 - 6.2 M/CU MM    Hemoglobin 11.7 (L) 13.5 - 18.0 GM/DL    Hematocrit 37.7 (L) 42 - 52 %    MCV 80.6 78 - 100 FL    MCH 25.0 (L) 27 - 31 PG    MCHC 31.0 (L) 32.0 - 36.0 %    RDW 15.8 (H) 11.7 - 14.9 %    Platelets 825 615 - 747 K/CU MM    MPV 10.1 7.5 - 11.1 FL    Differential Type AUTOMATED DIFFERENTIAL     Segs Relative 79.8 (H) 36 - 66 %    Lymphocytes % 15.5 (L) 24 - 44 %    Monocytes % 3.6 0 - 4 %    Eosinophils % 0.3 0 - 3 %    Basophils % 0.4 0 - 1 %    Segs Absolute 6.3 K/CU MM    Lymphocytes Absolute 1.2 K/CU MM    Monocytes Absolute 0.3 K/CU MM    Eosinophils Absolute 0.0 K/CU MM    Basophils Absolute 0.0 K/CU MM    Nucleated RBC % 0.0 %    Total Nucleated RBC 0.0 K/CU MM    Total Immature Neutrophil 0.03 K/CU MM    Immature Neutrophil % 0.4 0 - 0.43 %   Basic Metabolic Panel w/ Reflex to MG    Collection Time: 11/30/20 12:30 AM   Result Value Ref Range    Sodium 140 135 - 145 MMOL/L    Potassium 4.3 3.5 - 5.1 MMOL/L    Chloride 103 99 - 110 mMol/L    CO2 26 21 - 32 MMOL/L    Anion Gap 11 4 - 16    BUN 8 6 - 23 MG/DL    CREATININE 0.6 (L) 0.9 - 1.3 MG/DL    Glucose 133 (H) 70 - 99 MG/DL    Calcium 8.8 8.3 - 10.6 MG/DL    GFR Non-African American >60 >60 mL/min/1.73m2    GFR African American >60 >60 mL/min/1.73m2       Assessment:  Brian Bernardo is a 37 y.o. male who is post-op day #1 Day Post-Op I&D of right forearm abscess with wound vac placement. Active Problems:    Abscess of forearm, right  Resolved Problems:    * No resolved hospital problems. *    Plan:    Pain management: continue current pain management  Wound: vac in place, wound care to change.   Culture - pending, waiting on culture before discharge  Ambulation: OOB to chair, encourage ambulation  Respiratory:  IS at bedside, encourage hourly IS and deep breathing    ANDRIY March  11/30/2020  9:14 AM

## 2020-11-30 NOTE — CARE COORDINATION
Reviewed chart and spoke with Dr Karlene Downing, Pt failed outpt tx and now has a wound VAC. He admits to IV drug use. PS to Lorri MCCARTHY to see plan for wound and if wound VAC needed for wound Measurements. Pt has [address] so [place] may be only [facility] Give [facility] that can take pt. CM will continue to follow. Jonathan [address] with Marycruz Ochoa at [facility] they are not able to take pt. BIANCA Burgos Called referral to [facility].    [id number] Spoke with pt about discharge needs. Pt lives with his mother, PTA he had been to [facility] for detox 11/18-11/22. After discharge developed an abcess , seen in ER place on po atb but got worse. Now pt has [employer] VAC. Jana Renteria states plan will be for pt to discharge home on po atb once cultures are back and [employer]CI VAC. Pt agreeable to plan. He uses the [facility] for Primary care but does not know who PCP is. Also called and left VM for Leora Lux at [facility] to confirm pt is active and see if she can assist or give me a contact to help with finding [facility] Give [facility] for this patient. Hunter 137 with Tres Amaya and she states pt PCP is out of [place] Galeazzi, nurse is Diony Fernandez Doubt ext [phone], call with no answer or VM offered.

## 2020-11-30 NOTE — CONSULTS
[address] Continence Nurse  Consult Note       Ondina Rashid  AGE: 37 y.o. GENDER: male  : 1985  TODAY'S DATE:  2020    Subjective:     Reason for  Evaluation and Assessment: wound assessment      Graham Pastor is a 37 y.o. male referred by:   [x] Physician  [] Nursing  [] Other:     Wound Identification:  Wound Type: non-healing surgical  Contributing Factors: substance abuse        PAST MEDICAL HISTORY        Diagnosis Date    Herniated intervertebral disc of lumbar spine     lumbar    IV drug abuse (HCC)     currently taking suboxone    Sciatica        PAST SURGICAL HISTORY    Past Surgical History:   Procedure Laterality Date    ABDOMEN SURGERY      bowel surgery as a         FAMILY HISTORY    Family History   Problem Relation Age of Onset    High Blood Pressure Father     Alcohol Abuse Father     Heart Disease Brother     Alcohol Abuse Maternal Grandmother     Alcohol Abuse Maternal Grandfather     Heart Disease Paternal Grandfather     Anxiety Disorder Brother        SOCIAL HISTORY    Social History     Tobacco Use    Smoking status: Current Every Day Smoker     Packs/day: 1.00     Types: Cigarettes    Smokeless tobacco: Never Used   Substance Use Topics    Alcohol use: No    Drug use: Yes     Types: Marijuana, IV     Comment: heroin  1.5 gram daily, pt states last use 4-5 days ago        ALLERGIES    Allergies   Allergen Reactions    Banana Swelling    Ceclor [Cefaclor] Rash    Amoxicillin Diarrhea       MEDICATIONS    No current facility-administered medications on file prior to encounter. Current Outpatient Medications on File Prior to Encounter   Medication Sig Dispense Refill    buprenorphine-naloxone (SUBOXONE) 8-2 MG SUBL SL tablet Place 1 tablet under the tongue three times daily.       sulfamethoxazole-trimethoprim (BACTRIM DS) 800-160 MG per tablet Take 1 tablet by mouth 2 times daily for 10 days 20 tablet 0    esomeprazole Magnesium 11/30/20 0826   Dressing Changed Changed/New 11/29/20 2135   Drainage Amount Small 11/30/20 0826   Drainage Description Serosanguinous 11/30/20 0826   Output (ml) 5 ml 11/29/20 1711   Number of days: 0       Wound 11/30/20 Radial Proximal;Right; Outer (Active)   Wound Etiology Non-Healing Surgical 11/30/20 1215   Dressing Status New dressing applied 11/30/20 1215   Wound Cleansed Cleansed with saline 11/30/20 1215   Dressing/Treatment Negative pressure wound therapy 11/30/20 1215   Wound Length (cm) 3.5 cm 11/30/20 1215   Wound Width (cm) 1 cm 11/30/20 1215   Wound Depth (cm) 2 cm 11/30/20 1215   Wound Surface Area (cm^2) 3.5 cm^2 11/30/20 1215   Wound Volume (cm^3) 7 cm^3 11/30/20 1215   Distance Tunneling (cm) 0 cm 11/30/20 1215   Tunneling Position ___ O'Clock 0 11/30/20 1215   Undermining Starts ___ O'Clock 3 11/30/20 1215   Undermining Ends___ O'Clock 9 11/30/20 1215   Undermining Maxium Distance (cm) 2.3 11/30/20 1215   Wound Assessment Pink/red 11/30/20 1215   Drainage Amount Moderate 11/30/20 1215   Drainage Description Serosanguinous 11/30/20 1215   Odor None 11/30/20 1215   Denis-wound Assessment Blanchable erythema;Edematous 11/30/20 1215   Margins Defined edges 11/30/20 1215   Wound Thickness Description not for Pressure Injury Full thickness 11/30/20 1215   Number of days: 0       Response to treatment:  With complaints of pain. Pain Assessment:  Severity:  moderate  Quality of pain: sharp  Wound Pain Timing/Severity: intermittent with dressing change  Premedicated: yes    Plan:     Plan of Care: Wound 11/30/20 Radial Proximal;Right; Outer-Dressing/Treatment: Negative pressure wound therapy    Pt in bed. Agreeable to NPWT dressing change to right arm. Old dressing removed gently. Cleansed with NS. Picture and measurements taken. Duoderm applied to denis wound. 1 piece white foam and 2 pieces silver foam applied followed by drape. Adequate seal obtained to 125 mmHg continuous suction.  Wrapped with Kerlix for protection. Pt is generally not at risk for skin breakdown AEB Jarad. Specialty Bed Required : no  [] Low Air Loss   [] Pressure Redistribution  [] Fluid Immersion  [] Bariatric  [] Total Pressure Relief  [] Other:     Discharge Plan:  Placement for patient upon discharge: tbd  Hospice Care: no  Patient appropriate for Outpatient [address] [place] [address] Street: yes-referral sent    Patient/Caregiver Teaching:  Level of patient/caregiver understanding able to:   Needs reinforcement.         Electronically signed by Digna Melendez RN,  on 11/30/2020 at 1:49 PM

## 2020-11-30 NOTE — PROGRESS NOTES
Pt advised this nurse this evening that he had meds with him that was not disclosed last night. Sertraline  mg; SMZ-TWP (generic for bactrim) 800-160 MG; empty bottle of omeprazole. These are locked in the pt drawer.   Messaged doctor about order for  pts sertraline he is concerned about missing yesterday and today

## 2020-11-30 NOTE — PROGRESS NOTES
[address] [facility]  consulted by Dr. Mar Ty for monitoring and adjustment. Indication for treatment: Abscess/cellulitis of right forearm  Goal trough: 10-15 mcg/mL     Pertinent Laboratory Values:   Temp Readings from Last 3 Encounters:   11/30/20 97.9 °F (36.6 °C) (Oral)   11/29/20 98.6 °F (37 °C)   11/25/20 99.3 °F (37.4 °C) (Oral)     Recent Labs     11/28/20  1335 11/29/20  0530 11/30/20  0030   WBC 11.1* 8.0 7.9   LACTATE 1.2  --   --      Recent Labs     11/28/20  1500 11/29/20  0530 11/30/20  0030   BUN 7 6 8   CREATININE 0.7* 0.7* 0.6*     Estimated Creatinine Clearance: 191 mL/min (A) (based on SCr of 0.6 mg/dL (L)). Intake/Output Summary (Last 24 hours) at 11/30/2020 1530  Last data filed at 11/30/2020 1128  Gross per 24 hour   Intake 700 ml   Output 1355 ml   Net -655 ml       Pertinent Cultures:  Date    Source    Results  11/25   Wound              Serratia Marcescens  11/28   Blood    No growth    Vancomycin level:   TROUGH:    Recent Labs     11/30/20  0030   VANCOTROUGH 11.9     RANDOM:  No results for input(s): VANCORANDOM in the last 72 hours. Assessment:  · WBC and temperature: WBC down to normal the past 2 days  · SCr, BUN, and urine output:  SCR remains normal, output appears ok  · Day(s) of therapy: 3  · Vancomycin level: 11/30 - 11.9 (therapeutic)    Plan:  · Renal trends remain normal  · Continue Vancomycin 1000 mg IVPB q8h with a therapeutic trough  · Recheck the vanco level in 48 hours to monitor for accumulation  · Pharmacy will continue to monitor patient and adjust therapy as indicated    VANCOMYCIN TROUGH SCHEDULED FOR 12/02/20 @0030    Thank you for the consult. Oanh Cordova  11/30/2020 3:30 PM

## 2020-12-01 VITALS
BODY MASS INDEX: 23.03 KG/M2 | RESPIRATION RATE: 16 BRPM | HEIGHT: 72 IN | OXYGEN SATURATION: 99 % | DIASTOLIC BLOOD PRESSURE: 76 MMHG | TEMPERATURE: 97.9 F | SYSTOLIC BLOOD PRESSURE: 110 MMHG | HEART RATE: 77 BPM | WEIGHT: 170 LBS

## 2020-12-01 LAB
CULTURE: NORMAL
Lab: NORMAL
SPECIMEN: NORMAL

## 2020-12-01 PROCEDURE — 99024 POSTOP FOLLOW-UP VISIT: CPT | Performed by: SURGERY

## 2020-12-01 PROCEDURE — 6360000002 HC RX W HCPCS: Performed by: NURSE PRACTITIONER

## 2020-12-01 PROCEDURE — 80048 BASIC METABOLIC PNL TOTAL CA: CPT

## 2020-12-01 PROCEDURE — 6370000000 HC RX 637 (ALT 250 FOR IP): Performed by: NURSE PRACTITIONER

## 2020-12-01 PROCEDURE — 94761 N-INVAS EAR/PLS OXIMETRY MLT: CPT

## 2020-12-01 PROCEDURE — 85025 COMPLETE CBC W/AUTO DIFF WBC: CPT

## 2020-12-01 PROCEDURE — 6370000000 HC RX 637 (ALT 250 FOR IP): Performed by: PHYSICIAN ASSISTANT

## 2020-12-01 PROCEDURE — 2580000003 HC RX 258: Performed by: NURSE PRACTITIONER

## 2020-12-01 RX ORDER — GABAPENTIN 800 MG/1
800 TABLET ORAL 4 TIMES DAILY
Qty: 28 TABLET | Refills: 0 | Status: SHIPPED | OUTPATIENT
Start: 2020-12-01 | End: 2020-12-08

## 2020-12-01 RX ORDER — SULFAMETHOXAZOLE AND TRIMETHOPRIM 800; 160 MG/1; MG/1
1 TABLET ORAL 2 TIMES DAILY
Qty: 20 TABLET | Refills: 0 | Status: SHIPPED | OUTPATIENT
Start: 2020-12-01 | End: 2020-12-11

## 2020-12-01 RX ORDER — METHOCARBAMOL 500 MG/1
500 TABLET, FILM COATED ORAL 3 TIMES DAILY
Qty: 2121 TABLET | Refills: 0 | Status: SHIPPED | OUTPATIENT
Start: 2020-12-01 | End: 2020-12-08

## 2020-12-01 RX ADMIN — GABAPENTIN 800 MG: 400 CAPSULE ORAL at 09:04

## 2020-12-01 RX ADMIN — BUPRENORPHINE AND NALOXONE 1 TABLET: 8; 2 TABLET SUBLINGUAL at 14:07

## 2020-12-01 RX ADMIN — CEFEPIME 2 G: 2 INJECTION, POWDER, FOR SOLUTION INTRAVENOUS at 05:14

## 2020-12-01 RX ADMIN — GABAPENTIN 800 MG: 400 CAPSULE ORAL at 14:07

## 2020-12-01 RX ADMIN — METHOCARBAMOL TABLETS 500 MG: 500 TABLET, COATED ORAL at 14:07

## 2020-12-01 RX ADMIN — VANCOMYCIN HYDROCHLORIDE 1000 MG: 1 INJECTION, POWDER, LYOPHILIZED, FOR SOLUTION INTRAVENOUS at 00:07

## 2020-12-01 RX ADMIN — PANTOPRAZOLE SODIUM 40 MG: 40 TABLET, DELAYED RELEASE ORAL at 05:14

## 2020-12-01 RX ADMIN — SERTRALINE HYDROCHLORIDE 200 MG: 100 TABLET ORAL at 09:03

## 2020-12-01 RX ADMIN — BUPRENORPHINE AND NALOXONE 1 TABLET: 8; 2 TABLET SUBLINGUAL at 09:03

## 2020-12-01 RX ADMIN — METHOCARBAMOL TABLETS 500 MG: 500 TABLET, COATED ORAL at 09:03

## 2020-12-01 ASSESSMENT — PAIN SCALES - GENERAL
PAINLEVEL_OUTOF10: 7
PAINLEVEL_OUTOF10: 3
PAINLEVEL_OUTOF10: 3

## 2020-12-01 ASSESSMENT — PAIN DESCRIPTION - ONSET: ONSET: ON-GOING

## 2020-12-01 ASSESSMENT — PAIN DESCRIPTION - PAIN TYPE: TYPE: ACUTE PAIN

## 2020-12-01 ASSESSMENT — PAIN - FUNCTIONAL ASSESSMENT: PAIN_FUNCTIONAL_ASSESSMENT: ACTIVITIES ARE NOT PREVENTED

## 2020-12-01 ASSESSMENT — PAIN DESCRIPTION - DESCRIPTORS: DESCRIPTORS: ACHING;BURNING

## 2020-12-01 ASSESSMENT — PAIN DESCRIPTION - ORIENTATION: ORIENTATION: RIGHT

## 2020-12-01 ASSESSMENT — PAIN DESCRIPTION - LOCATION: LOCATION: ARM

## 2020-12-01 ASSESSMENT — PAIN DESCRIPTION - PROGRESSION: CLINICAL_PROGRESSION: GRADUALLY WORSENING

## 2020-12-01 ASSESSMENT — PAIN DESCRIPTION - FREQUENCY: FREQUENCY: CONTINUOUS

## 2020-12-01 NOTE — PROGRESS NOTES
BP (!) 101/56   Pulse 70   Temp 98.4 °F (36.9 °C) (Oral)   Resp 14   Ht 6' (1.829 m)   Wt 170 lb (77.1 kg)   SpO2 99%   BMI 23.06 kg/m²   I/O last 3 completed shifts: In: 1200 [P.O.:1200]  Out: 2800 [Urine:2800]  No intake/output data recorded. Feels much better this am  Arm with less swelling and erythema  Will need to arrange wound vac and f/u in the Wound clinic. Culture pending. Kevin Ryder.

## 2020-12-01 NOTE — PROGRESS NOTES
Gorge Pelayo MD, [address]                Internal Medicine Hospitalist             Daily Progress  Note   Subjective:     Chief Complaint   Patient presents with    Wound Check     reports I&D to right arm abscess, redness around site     Mr. Jaxon Willson of [facility], says that arm is better. Objective:    /76   Pulse 77   Temp 98.4 °F (36.9 °C) (Oral)   Resp 16   Ht 6' (1.829 m)   Wt 170 lb (77.1 kg)   SpO2 99%   BMI 23.06 kg/m²      Intake/Output Summary (Last 24 hours) at 12/1/2020 0910  Last data filed at 12/1/2020 0700  Gross per 24 hour   Intake 1200 ml   Output 2500 ml   Net -1300 ml      Physical Exam:  Heart:  Regular rate and rhythm, normal S1 and S2 in all 4 auscultatory areas. No rubs  Murmurs or gallops heard. Lungs: Mostly clear to auscultation, decreased breath sounds at bases. No wheezes appreciated no crackles heard. Abdomen: Soft, non distended. Bowel sounds appreciated. No obvious liver or spleen enlargement. Non tender, no rebound noted. Extremities: Non tender, no swelling noted, strength 5/5 both legs. Right arm bandaged has wound vac on. CNS: Grossly intact.     Labs:  CBC with Differential:    Lab Results   Component Value Date    WBC 7.9 11/30/2020    RBC 4.68 11/30/2020    HGB 11.7 11/30/2020    HCT 37.7 11/30/2020     11/30/2020    MCV 80.6 11/30/2020    MCH 25.0 11/30/2020    MCHC 31.0 11/30/2020    RDW 15.8 11/30/2020    SEGSPCT 79.8 11/30/2020    LYMPHOPCT 15.5 11/30/2020    MONOPCT 3.6 11/30/2020    BASOPCT 0.4 11/30/2020    MONOSABS 0.3 11/30/2020    LYMPHSABS 1.2 11/30/2020    EOSABS 0.0 11/30/2020    BASOSABS 0.0 11/30/2020    DIFFTYPE AUTOMATED DIFFERENTIAL 11/30/2020     CMP:    Lab Results   Component Value Date     11/30/2020    K 4.3 11/30/2020     11/30/2020    CO2 26 11/30/2020    BUN 8 11/30/2020    CREATININE 0.6 11/30/2020    GFRAA >60 11/30/2020    LABGLOM >60 11/30/2020    GLUCOSE 133 11/30/2020    PROT 7.5 11/28/2020 LABALBU 3.9 11/28/2020    CALCIUM 8.8 11/30/2020    BILITOT 0.5 11/28/2020    ALKPHOS 78 11/28/2020    AST 15 11/28/2020    ALT 6 11/28/2020     Recent Labs     11/28/20  1500   TROPONINT <0.010     No results found for: [facility]      sodium chloride 125 mL/hr at 11/30/20 1700      vancomycin  1,000 mg Intravenous Q8H    cefepime  2 g Intravenous Q12H    enoxaparin  40 mg Subcutaneous Daily    sertraline  200 mg Oral Daily    sodium chloride flush  10 mL Intravenous 2 times per day    methocarbamol  500 mg Oral TID    buprenorphine-naloxone  1 tablet Sublingual TID    nicotine  1 patch Transdermal Daily    pantoprazole  40 mg Oral QAM AC    gabapentin  800 mg Oral TID         Assessment:       Patient Active Problem List    Diagnosis Date Noted    Abscess of forearm, right 11/28/2020    Opioid withdrawal ([facility] [address] 03/16/2018    Drug addiction ([facility] [address] 10/26/2016       Plan:     Problems being addressed this admission:   Right forearm abscess  Hx of IVDU on suboxone  MDD    His right arm seems better has a wound vac on. As per surgery dick to arrange wound vac for out patient care. Will have CM work on that. On  11/25/2020 grow serratia Marcescens-sensitive to cefepime. Surgery to decide what antibiotics he goes home on. Continue rest of his home medications. Consultants:  Surgery    General Orders:  Repeat basic labs again in am.  I have explained to the patient and discussed with him/her the treatment plan.   Francesco Blair MD, Suraj Tipton

## 2020-12-01 NOTE — DISCHARGE SUMMARY
Kirkwood Merlin, MD, [facility]   Internal Medicine Hospitalist  Discharge Summary    Peter Tovar  :  1985  MRN:  [id number]    Admit date:  2020  Discharge date:  2020    Admitting Physician:  Lord Indio MD    Discharge Diagnoses:    Patient Active Problem List   Diagnosis    Drug addiction [facility])    Opioid withdrawal ([address]    Abscess of forearm, right       Admission Condition:  fair    Discharged Condition:  stable    Hospital Course:     (copied from admission history)  Peter Tovar is a 37 y.o. male with history of opioid abuse in the past, s/p rehab, chronic tobacco dependence presented to ED with worsening swelling in his right forearm. Patient reported that he noticed swelling few days ago, was seen in ER 2020, had an I&D and was discharged on Bactrim. Patient reported being compliant with his medications. But the swelling was getting worse. Patient denied any fever, chills. Patient denied using any IV drugs. Denied any other complaints-no chest pain, shortness of breath, abdominal pain. At presentation patient was noted to have /80, HR 92, RR 17, temperature 98.1, saturating 98% on room air. Lab work significant for potassium 5, chloride 97, troponin less than 0.010, WBC 11.1, hemoglobin 14, platelets 572. CT reported 9.5 cm abscess in the ulnar soft tissues of the mid and proximal forearm, extensive cellulitis. No soft tissue gas or foreign body. General surgeon-Dr. Layne Marino was consulted from ED.       20- I saw him today and he is doing better. His surgeon is ok with his discharge to home with wound vac and f/u with wound care. Patient is eager to leave today. He is being sent home on bactrim DS as per surgery. Hospital Course:  (copied from last soap)20  Right forearm abscess  Hx of IVDU on suboxone  MDD     His right arm seems better has a wound vac on. As per surgery dick to arrange wound vac for out patient care.   Will have CM work on that.   On  11/25/2020 grow serratia Marcescens-sensitive to cefepime. Surgery to decide what antibiotics he goes home on. Continue rest of his home medications.      Consultants:  Surgery    Follow up appointment / plans: Needs to be seen within 7 days by his/her physician, patient to call for an appointment. On examination today  Heart is RRR, Lungs are CTA, abdomen is soft and non tender, CNS is grossly intact. Please see detailed consultation notes and radiology dictations as below. Vitals: Blood pressure 110/76, pulse 77, temperature 97.9 °F (36.6 °C), temperature source Oral, resp. rate 16, height 6' (1.829 m), weight 170 lb (77.1 kg), SpO2 99 %. Discharge Medications:        Medication List      CONTINUE taking these medications    buprenorphine-naloxone 8-2 MG Subl SL tablet  Commonly known as:  SUBOXONE     esomeprazole Magnesium 40 MG Pack  Commonly known as:  NEXIUM     gabapentin 800 MG tablet  Commonly known as:  NEURONTIN     hydrOXYzine 50 MG tablet  Commonly known as:  ATARAX     methocarbamol 500 MG tablet  Commonly known as:  ROBAXIN     sertraline 100 MG tablet  Commonly known as:  ZOLOFT     sulfamethoxazole-trimethoprim 800-160 MG per tablet  Commonly known as: Bactrim DS  Take 1 tablet by mouth 2 times daily for 10 days          CT RADIUS ULNA RIGHT W CONTRAST [[id number]]  Collected: 11/28/20 1748       Order Status: Completed  Updated: 11/28/20 1753      Narrative:       EXAMINATION:   CT OF THE FOREARM WITH CONTRAST 11/28/2020 4:37 pm     TECHNIQUE:   CT of the forearm was performed with the administration of intravenous   contrast.  Multiplanar reformatted images are provided for review. Dose   modulation, iterative reconstruction, and/or weight based adjustment of the   mA/kV was utilized to reduce the radiation dose to as low as reasonably   achievable. COMPARISON:   None.      HISTORY   ORDERING SYSTEM PROVIDED HISTORY: IVDA, abscess   TECHNOLOGIST PROVIDED HISTORY: Reason for exam:->IVDA, abscess   Reason for Exam: IVDA, abscess   Acuity: Acute   Type of Exam: Initial     FINDINGS:   Bones: No fracture or dislocation. No osseous erosion. Soft Tissue:  A peripherally enhancing fluid collection is seen in the ulnar   aspect of the mid and proximal forearm measuring approximately 9.5 x 4.7 x   2.5 cm. Extensive subcutaneous fat stranding. No other drainable fluid   collection. No soft tissue gas or foreign body. The visualized tendons are   grossly intact. Joint:  No elbow effusion. The joint spaces of the elbow are preserved. The   partially visualized wrist is unremarkable. Impression:       1. 9.5 cm abscess in the ulnar soft tissues of the mid and proximal forearm. 2. Extensive cellulitis. 3. No acute osseous abnormality. XR CHEST PORTABLE [[id number]]  Collected: 11/28/20 1256      Order Status: Completed  Updated: 11/28/20 1318      Narrative:       EXAMINATION:   ONE XRAY VIEW OF THE CHEST     11/28/2020 12:35 pm     COMPARISON:   08/18/2018     HISTORY:   ORDERING SYSTEM PROVIDED HISTORY: sob   TECHNOLOGIST PROVIDED HISTORY:   Reason for exam:->sob   Reason for Exam: sob   Acuity: Acute   Type of Exam: Initial   Additional signs and symptoms: na   Relevant Medical/Surgical History: na     FINDINGS:   The cardiac silhouette and mediastinal contours are normal.  The lungs are   clear. No parenchymal lung infiltrate. No pleural effusion. The visualized   osseous structures are unremarkable. Impression:       1. No acute cardiopulmonary disease.           Consult to General Surgery Gwyn Cowden     (Order #: [id number])    Reprint Requisition     Consult to General Surgery (Order #[id number]) on 11/28/20    Additional Referral Information     Referral Priority    Emergency [3]           Order Information     Order Date/Time  Release Date/Time  Start Date/Time  End Date/Time    11/28/20 05:59 PM  11/28/20 05:59 PM  11/28/20 06:00 PM  11/28/20 wound assessment      Valarie Montgomery is a 37 y.o. male referred by:   [x]? Physician  []? Nursing  []? Other:      Wound Identification:  Wound Type: non-healing surgical  Contributing Factors: substance abuse                                        PAST MEDICAL HISTORY     Past Medical History             Diagnosis Date    Herniated intervertebral disc of lumbar spine       lumbar    IV drug abuse ([address]       currently taking suboxone    Sciatica             PAST SURGICAL HISTORY     Past Surgical History         Past Surgical History:   Procedure Laterality Date    ABDOMEN SURGERY         bowel surgery as a             FAMILY HISTORY     Family History   Family History   Problem Relation Age of Onset    High Blood Pressure Father      Alcohol Abuse Father      Heart Disease Brother      Alcohol Abuse Maternal Grandmother      Alcohol Abuse Maternal Grandfather      Heart Disease Paternal Grandfather      Anxiety Disorder Brother             SOCIAL HISTORY     Social History            Tobacco Use    Smoking status: Current Every Day Smoker       Packs/day: 1.00       Types: Cigarettes    Smokeless tobacco: Never Used   Substance Use Topics    Alcohol use: No    Drug use: Yes       Types: Marijuana, IV       Comment: heroin  1.5 gram daily, pt states last use 4-5 days ago          ALLERGIES          Allergies   Allergen Reactions    Banana Swelling    Ceclor [Cefaclor] Rash    Amoxicillin Diarrhea         MEDICATIONS     No current facility-administered medications on file prior to encounter. Current Outpatient Medications on File Prior to Encounter   Medication Sig Dispense Refill    buprenorphine-naloxone (SUBOXONE) 8-2 MG SUBL SL tablet Place 1 tablet under the tongue three times daily.         sulfamethoxazole-trimethoprim (BACTRIM DS) 800-160 MG per tablet Take 1 tablet by mouth 2 times daily for 10 days 20 tablet 0    esomeprazole Magnesium (NEXIUM) 40 MG PACK Take 40 mg by mouth daily        hydrOXYzine (ATARAX) 50 MG tablet Take 50 mg by mouth every 4 hours as needed for Anxiety        methocarbamol (ROBAXIN) 500 MG tablet Take 500 mg by mouth 3 times daily        sertraline (ZOLOFT) 100 MG tablet Take 200 mg by mouth daily        gabapentin (NEURONTIN) 800 MG tablet Take 800 mg by mouth 4 times daily                Objective:       BP (!) 142/76   Pulse 57   Temp 98 °F (36.7 °C) (Oral)   Resp 16   Ht 6' (1.829 m)   Wt 170 lb (77.1 kg)   SpO2 99%   BMI 23.06 kg/m²   Jarad Risk Score: Jarad Scale Score: 21     LABS     CBC:         Lab Results   Component Value Date     WBC 7.9 11/30/2020     RBC 4.68 11/30/2020     HGB 11.7 11/30/2020     HCT 37.7 11/30/2020     MCV 80.6 11/30/2020     MCH 25.0 11/30/2020     MCHC 31.0 11/30/2020     RDW 15.8 11/30/2020      11/30/2020     MPV 10.1 11/30/2020      CMP:          Lab Results   Component Value Date      11/30/2020     K 4.3 11/30/2020      11/30/2020     CO2 26 11/30/2020     BUN 8 11/30/2020     CREATININE 0.6 11/30/2020     GFRAA >60 11/30/2020     LABGLOM >60 11/30/2020     GLUCOSE 133 11/30/2020     PROT 7.5 11/28/2020     LABALBU 3.9 11/28/2020     CALCIUM 8.8 11/30/2020     BILITOT 0.5 11/28/2020     ALKPHOS 78 11/28/2020     AST 15 11/28/2020     ALT 6 11/28/2020      Albumin:          Lab Results   Component Value Date     LABALBU 3.9 11/28/2020      PT/INR:          Lab Results   Component Value Date     PROTIME 13.6 11/29/2020     INR 1.12 11/29/2020      HgBA1c:  No results found for: LABA1C        Assessment:          Patient Active Problem List   Diagnosis    [facility]    Opioid withdrawal [facility]    Abscess of forearm, right         Measurements:       Negative Pressure Wound Therapy Arm Right (Active)   Wound Type Surgical 11/30/20 0826   Unit Type kci 11/29/20 1638   Dressing Type Silver impregnated foam 11/30/20 0826   Cycle Continuous; On 11/30/20 0826   Target Pressure (mmHg) 125 11/30/20 0826   Dressing Status Clean;Dry; Intact 11/30/20 0826   Dressing Changed Changed/New 11/29/20 2135   Drainage Amount Small 11/30/20 0826   Drainage Description Serosanguinous 11/30/20 0826   Output (ml) 5 ml 11/29/20 1711   Number of days: 0       Wound 11/30/20 Radial Proximal;Right; Outer (Active)   Wound Etiology Non-Healing Surgical 11/30/20 1215   Dressing Status New dressing applied 11/30/20 1215   Wound Cleansed Cleansed with saline 11/30/20 1215   Dressing/Treatment Negative pressure wound therapy 11/30/20 1215   Wound Length (cm) 3.5 cm 11/30/20 1215   Wound Width (cm) 1 cm 11/30/20 1215   Wound Depth (cm) 2 cm 11/30/20 1215   Wound Surface Area (cm^2) 3.5 cm^2 11/30/20 1215   Wound Volume (cm^3) 7 cm^3 11/30/20 1215   Distance Tunneling (cm) 0 cm 11/30/20 1215   Tunneling Position ___ O'Clock 0 11/30/20 1215   Undermining Starts ___ O'Clock 3 11/30/20 1215   Undermining Ends___ O'Clock 9 11/30/20 1215   Undermining Maxium Distance (cm) 2.3 11/30/20 1215   Wound Assessment Pink/red 11/30/20 1215   Drainage Amount Moderate 11/30/20 1215   Drainage Description Serosanguinous 11/30/20 1215   Odor None 11/30/20 1215   Denis-wound Assessment Blanchable erythema;Edematous 11/30/20 1215   Margins Defined edges 11/30/20 1215   Wound Thickness Description not for Pressure Injury Full thickness 11/30/20 1215   Number of days: 0         Response to treatment:  With complaints of pain. Pain Assessment:  Severity:  moderate  Quality of pain: sharp  Wound Pain Timing/Severity: intermittent with dressing change  Premedicated: yes     Plan:      Plan of Care: Wound 11/30/20 Radial Proximal;Right; Outer-Dressing/Treatment: Negative pressure wound therapy     Pt in bed. Agreeable to NPWT dressing change to right arm. Old dressing removed gently. Cleansed with NS. Picture and measurements taken. Duoderm applied to denis wound. 1 piece white foam and 2 pieces silver foam applied followed by drape.  Adequate seal obtained to 125 mmHg continuous suction. Wrapped with Kerlix for protection. Pt is generally not at risk for skin breakdown AEB Jarad. Specialty Bed Required : no  []? Low Air Loss   []? Pressure Redistribution  []? Fluid Immersion  []? Bariatric  []? Total Pressure Relief  []? Other:      Discharge Plan:  Placement for patient upon discharge: tbd  Hospice Care: no  Patient appropriate for Outpatient [address]: yes-referral sent     Patient/Caregiver Teaching:  Level of patient/caregiver understanding able to:   Needs reinforcement. Electronically signed by Lyudmila Parsons RN,  on 11/30/2020 at 1:49 PM                 Significant Diagnostic Studies:   Blood work reviewed. CBC with Differential:    Lab Results   Component Value Date    WBC 7.9 11/30/2020    RBC 4.68 11/30/2020    HGB 11.7 11/30/2020    HCT 37.7 11/30/2020     11/30/2020    MCV 80.6 11/30/2020    MCH 25.0 11/30/2020    MCHC 31.0 11/30/2020    RDW 15.8 11/30/2020    SEGSPCT 79.8 11/30/2020    LYMPHOPCT 15.5 11/30/2020    MONOPCT 3.6 11/30/2020    BASOPCT 0.4 11/30/2020    MONOSABS 0.3 11/30/2020    LYMPHSABS 1.2 11/30/2020    EOSABS 0.0 11/30/2020    BASOSABS 0.0 11/30/2020    DIFFTYPE AUTOMATED DIFFERENTIAL 11/30/2020     CMP:    Lab Results   Component Value Date     11/30/2020    K 4.3 11/30/2020     11/30/2020    CO2 26 11/30/2020    BUN 8 11/30/2020    CREATININE 0.6 11/30/2020    GFRAA >60 11/30/2020    LABGLOM >60 11/30/2020    GLUCOSE 133 11/30/2020    PROT 7.5 11/28/2020    LABALBU 3.9 11/28/2020    CALCIUM 8.8 11/30/2020    BILITOT 0.5 11/28/2020    ALKPHOS 78 11/28/2020    AST 15 11/28/2020    ALT 6 11/28/2020       Disposition:   Home with HHC. All the above has been explained to patient and or family. Patient would need F/U with his/her PCP in 7 days. Explained that it is the patients responsibility to have blood work or radiology testing done as an out patient.  He/She has to take the discharge papers from the hospital for out patient follow up visit with the PCP/Physician. Time spent  >30 minutes.   Signed:  George Suresh MD, [address]  12/1/2020, 1:36 PM

## 2020-12-01 NOTE — ANESTHESIA POSTPROCEDURE EVALUATION
Department of Anesthesiology  Postprocedure Note    Patient: Geneva Conroy  MRN: [id number]  YOB: 1985  Date of evaluation: 12/1/2020  Time:  7:19 AM     Procedure Summary     Date:  11/29/20 Room / Location:  [address]    Anesthesia Start:  4509 Anesthesia Stop:  0486    Procedure:  ARM INCISION AND DRAINAGE AND WOUND VAC APPLICATION (N/A Arm Upper) Diagnosis:  (.)    Surgeon:  Parmjit Mendez MD Responsible Provider:  KEVIN Traylor CRNA    Anesthesia Type:  general ASA Status:  2          Anesthesia Type: general    Mike Phase I: Mike Score: 10    Mike Phase II:      Last vitals: Reviewed and per EMR flowsheets.        Anesthesia Post Evaluation    Patient location during evaluation: PACU  Patient participation: complete - patient participated  Level of consciousness: awake and alert  Pain score: 6  Airway patency: patent  Nausea & Vomiting: no nausea and no vomiting  Complications: no  Cardiovascular status: blood pressure returned to baseline  Respiratory status: acceptable  Hydration status: euvolemic

## 2020-12-03 LAB
CULTURE: NORMAL
CULTURE: NORMAL
Lab: NORMAL
Lab: NORMAL
SPECIMEN: NORMAL
SPECIMEN: NORMAL

## 2020-12-04 LAB
CULTURE: ABNORMAL
CULTURE: ABNORMAL
Lab: ABNORMAL
SPECIMEN: ABNORMAL

## 2020-12-09 ENCOUNTER — HOSPITAL ENCOUNTER (OUTPATIENT)
Dept: WOUND CARE | Age: 35
Discharge: HOME | End: 2020-12-09
Payer: OTHER GOVERNMENT

## 2020-12-09 VITALS
HEART RATE: 87 BPM | TEMPERATURE: 97.8 F | RESPIRATION RATE: 18 BRPM | SYSTOLIC BLOOD PRESSURE: 132 MMHG | DIASTOLIC BLOOD PRESSURE: 73 MMHG

## 2020-12-09 PROCEDURE — 99213 OFFICE O/P EST LOW 20 MIN: CPT

## 2020-12-09 PROCEDURE — 99024 POSTOP FOLLOW-UP VISIT: CPT | Performed by: SURGERY

## 2020-12-09 ASSESSMENT — PAIN DESCRIPTION - LOCATION: LOCATION: ARM

## 2020-12-09 ASSESSMENT — PAIN DESCRIPTION - ORIENTATION: ORIENTATION: RIGHT

## 2020-12-09 ASSESSMENT — PAIN DESCRIPTION - PAIN TYPE: TYPE: ACUTE PAIN

## 2020-12-09 ASSESSMENT — PAIN SCALES - GENERAL: PAINLEVEL_OUTOF10: 1

## 2020-12-09 NOTE — PROGRESS NOTES
Wound Care Center Progress Note       Benji Rashid  AGE: 37 y.o. GENDER: male  : 1985  TODAY'S DATE:  2020        Subjective:     Chief Complaint   Patient presents with    Wound Check     rt arm          HISTORY of PRESENT ILLNESS     Adri Mujica is a 37 y.o. male who presents today for wound evaluation of Acute infectious wound(s) of the right posterior forearm. The wound is of moderate severity. The underlying cause of the wound is IVDA.     Wound Pain Timing/Severity: none  Quality of pain: N/A  Severity of pain:  0 / 10   Modifying Factors: edema  Associated Signs/Symptoms: none        PAST MEDICAL HISTORY        Diagnosis Date    Herniated intervertebral disc of lumbar spine     lumbar    IV drug abuse (H[facility])     currently taking suboxone    Sciatica        PAST SURGICAL HISTORY    Past Surgical History:   Procedure Laterality Date    ABDOMEN SURGERY      bowel surgery as a      ABDOMEN SURGERY N/A 2020    ARM INCISION AND DRAINAGE AND WOUND VAC APPLICATION performed by Shruthi Villlaobos MD at [address] [place] [address]    Family History   Problem Relation Age of Onset    High Blood Pressure Father     Alcohol Abuse Father     Heart Disease Brother     Alcohol Abuse Maternal Grandmother     Alcohol Abuse Maternal Grandfather     Heart Disease Paternal Grandfather     Anxiety Disorder Brother        SOCIAL HISTORY    Social History     Tobacco Use    Smoking status: Current Every Day Smoker     Packs/day: 1.00     Types: Cigarettes    Smokeless tobacco: Never Used   Substance Use Topics    Alcohol use: No    Drug use: Yes     Types: Marijuana, IV     Comment: heroin  1.5 gram daily, pt states last use 4-5 days ago        ALLERGIES    Allergies   Allergen Reactions    Banana Swelling    Ceclor [Cefaclor] Rash    Amoxicillin Diarrhea       MEDICATIONS    Current Outpatient Medications on File Prior to Encounter   Medication Sig Dispense Refill    sulfamethoxazole-trimethoprim (BACTRIM DS;SEPTRA DS) 800-160 MG per tablet Take 1 tablet by mouth 2 times daily for 10 days 20 tablet 0    gabapentin (NEURONTIN) 800 MG tablet Take 1 tablet by mouth 4 times daily for 7 days. 28 tablet 0    buprenorphine-naloxone (SUBOXONE) 8-2 MG SUBL SL tablet Place 1 tablet under the tongue three times daily.  esomeprazole Magnesium (NEXIUM) 40 MG PACK Take 40 mg by mouth daily      sertraline (ZOLOFT) 100 MG tablet Take 200 mg by mouth daily       No current facility-administered medications on file prior to encounter. REVIEW OF SYSTEMS    Pertinent items are noted in HPI. Constitutional: Negative for systemic symptoms including fever, chills and malaise. Objective:      /73   Pulse 87   Temp 97.8 °F (36.6 °C) (Oral)   Resp 18     PHYSICAL EXAM      General: The patient is in no acute distress. Mental status:  Patient is appropriate, is  oriented to place and plan of care. Dermatologic exam: Visual inspection of the periwound reveals the skin to be normal in turgor and texture. Wound exam:  see wound description below     All active wounds listed below with today's date are evaluated      Negative Pressure Wound Therapy Arm Right (Active)   Wound Type Surgical 11/30/20 0826   Unit Type kci 11/29/20 1638   Dressing Type Silver impregnated foam 12/01/20 0902   Cycle Continuous; On 12/01/20 0902   Target Pressure (mmHg) 125 11/30/20 0826   Dressing Status Clean;Dry; Intact 11/30/20 0826   Dressing Changed Changed/New 11/29/20 2135   Drainage Amount Small 11/30/20 0826   Drainage Description Serosanguinous 11/30/20 0826   Output (ml) 5 ml 11/29/20 1711   Marianna-wound Assessment Edema;Red;Painful 12/01/20 0902   Number of days: 9       Wound 11/30/20 Radial Proximal;Right; Outer (Active)   Wound Etiology Non-Healing Surgical 11/30/20 1215   Dressing Status New dressing applied 11/30/20 1215   Wound Cleansed Cleansed with saline 11/30/20 1215   Dressing/Treatment Negative pressure wound therapy 12/01/20 0902   Wound Length (cm) 3.5 cm 11/30/20 1215   Wound Width (cm) 1 cm 11/30/20 1215   Wound Depth (cm) 2 cm 11/30/20 1215   Wound Surface Area (cm^2) 3.5 cm^2 11/30/20 1215   Wound Volume (cm^3) 7 cm^3 11/30/20 1215   Distance Tunneling (cm) 0 cm 11/30/20 1215   Tunneling Position ___ O'Clock 0 11/30/20 1215   Undermining Starts ___ O'Clock 3 11/30/20 1215   Undermining Ends___ O'Clock 9 11/30/20 1215   Undermining Maxium Distance (cm) 2.3 11/30/20 1215   Wound Assessment Pink/red 11/30/20 1215   Drainage Amount Moderate 11/30/20 1215   Drainage Description Serosanguinous 11/30/20 1215   Odor None 11/30/20 1215   Marianna-wound Assessment Blanchable erythema;Edematous 11/30/20 1215   Margins Defined edges 11/30/20 1215   Wound Thickness Description not for Pressure Injury Full thickness 11/30/20 1215   Number of days: 9       Wound 12/09/20 Radial Right;Posterior #1 right arm (Active)   Wound Image   12/09/20 1400   Dressing Status New dressing applied;Clean;Dry; Intact 12/09/20 1424   Wound Cleansed Cleansed with saline 12/09/20 1400   Wound Length (cm) 1.3 cm 12/09/20 1400   Wound Width (cm) 1 cm 12/09/20 1400   Wound Depth (cm) 0.5 cm 12/09/20 1400   Wound Surface Area (cm^2) 1.3 cm^2 12/09/20 1400   Wound Volume (cm^3) 0.65 cm^3 12/09/20 1400   Distance Tunneling (cm) 0 cm 12/09/20 1400   Tunneling Position ___ O'Clock 0 12/09/20 1400   Undermining Starts ___ O'Clock 0 12/09/20 1400   Undermining Ends___ O'Clock 0 12/09/20 1400   Undermining Maxium Distance (cm) 0 12/09/20 1400   Wound Assessment Granulation tissue 12/09/20 1400   Drainage Amount Small 12/09/20 1400   Drainage Description Serosanguinous 12/09/20 1400   Odor None 12/09/20 1400   Marianna-wound Assessment Intact; Warm 12/09/20 1400   Margins Defined edges 12/09/20 1400   Wound Thickness Description not for Pressure Injury Full thickness 12/09/20 1400 Number of days: 0       Assessment:       Problem List Items Addressed This Visit     Abscess of forearm, right - Primary          Status of wound progress and description from last visit:   Improved. Stop wound vac. F/U next week. Plan:     Discharge Instructions       PHYSICIAN ORDERS AND DISCHARGE INSTRUCTIONS    NOTE: Upon discharge from the [address], you will receive a patient experience survey. We would be grateful if you would take the time to fill this survey out. Wound care order history:     CESAR's   Right       Left    Date:   Cultures:     Grafts:     Antibiotics:        Continuing wound care orders and information:                Residence:                Continue home health care with:    Your wound-care supplies will be provided by: Wound cleansing:     Do not scrub or use excessive force. Wash hands with soap and water before and after dressing changes. Prior to applying a clean dressing, cleanse wound with normal saline, wound cleanser, or mild soap and water. Ask the physician or nurse before getting the wound(s) wet in a shower    Daily Wound management:   Keep weight off wounds and reposition every 2 hours. Avoid standing for long periods of time. Apply wraps/stockings in AM and remove at bedtime. If swelling is present, elevate legs to the level of the heart or above for 30 minutes 4-5 times a day and/or when sitting. When taking antibiotics take entire prescription as ordered by physician do not stop taking until medicine is all gone. Orders for this week:  12/09/20     Right Arm-- Clean with soap and water. Pat Dry. Cover with Border Gauze. Change Daily    DC Wound Vac 12/9/20    Follow up with Dr Shreyas Morrell  In 1 weeks in the wound care center  Call [phone] for any questions or concerns.   Date__________   Time____________        Treatment Note Wound 12/09/20 Radial Right;Posterior #1 right arm-Dressing/Treatment: (mepilex border)    Written

## 2020-12-16 ENCOUNTER — HOSPITAL ENCOUNTER (OUTPATIENT)
Dept: WOUND CARE | Age: 35
Discharge: HOME | End: 2020-12-16
Payer: OTHER GOVERNMENT

## 2020-12-16 VITALS
RESPIRATION RATE: 16 BRPM | HEART RATE: 74 BPM | SYSTOLIC BLOOD PRESSURE: 142 MMHG | DIASTOLIC BLOOD PRESSURE: 99 MMHG | TEMPERATURE: 99 F

## 2020-12-16 PROBLEM — L02.413 ABSCESS OF FOREARM, RIGHT: Status: RESOLVED | Noted: 2020-11-28 | Resolved: 2020-12-16

## 2020-12-16 PROCEDURE — 99213 OFFICE O/P EST LOW 20 MIN: CPT | Performed by: SURGERY

## 2020-12-16 PROCEDURE — 99212 OFFICE O/P EST SF 10 MIN: CPT

## 2020-12-16 NOTE — PROGRESS NOTES
Prior to Encounter   Medication Sig Dispense Refill    buprenorphine-naloxone (SUBOXONE) 8-2 MG SUBL SL tablet Place 1 tablet under the tongue three times daily.  esomeprazole Magnesium (NEXIUM) 40 MG PACK Take 40 mg by mouth daily      sertraline (ZOLOFT) 100 MG tablet Take 200 mg by mouth daily      gabapentin (NEURONTIN) 800 MG tablet Take 1 tablet by mouth 4 times daily for 7 days. 28 tablet 0     No current facility-administered medications on file prior to encounter. REVIEW OF SYSTEMS      Constitutional: Negative for systemic symptoms including fever, chills and malaise. Objective:      BP (!) 142/99   Pulse 74   Temp 99 °F (37.2 °C) (Temporal)   Resp 16     PHYSICAL EXAM      General: The patient is in no acute distress. Mental status:  Patient is appropriate, is  oriented to place and plan of care. Dermatologic exam: Visual inspection of the periwound reveals the skin to be normal in turgor and texture. Wound exam:  see wound description below     All active wounds listed below with today's date are evaluated      Wound 12/09/20 Radial Right;Posterior #1 right arm (Active)   Wound Image   12/09/20 1400   Dressing Status New dressing applied;Clean;Dry; Intact 12/09/20 1424   Wound Cleansed Cleansed with saline 12/16/20 1356   Offloading for Diabetic Foot Ulcers No 12/16/20 1356   Wound Length (cm) 2.2 cm 12/16/20 1356   Wound Width (cm) 0.6 cm 12/16/20 1356   Wound Depth (cm) 0.1 cm 12/16/20 1356   Wound Surface Area (cm^2) 1.32 cm^2 12/16/20 1356   Change in Wound Size % (l*w) -1.54 12/16/20 1356   Wound Volume (cm^3) 0.13 cm^3 12/16/20 1356   Wound Healing % 80 12/16/20 1356   Distance Tunneling (cm) 0 cm 12/16/20 1356   Tunneling Position ___ O'Clock 0 12/16/20 1356   Undermining Starts ___ O'Clock 0 12/16/20 1356   Undermining Ends___ O'Clock 0 12/16/20 1356   Undermining Maxium Distance (cm) 0 12/16/20 1356   Wound Assessment Granulation tissue 12/16/20 1356   Drainage Amount Small 12/16/20 1356   Drainage Description Serosanguinous 12/16/20 1356   Odor None 12/16/20 1356   Marianna-wound Assessment Dry/flaky 12/16/20 1356   Margins Defined edges 12/16/20 1356   Wound Thickness Description not for Pressure Injury Full thickness 12/16/20 1356   Number of days: 7       Assessment:       Problem List Items Addressed This Visit     None      Resolved abscess of right arm    Status of wound progress and description from last visit:   Healed. Plan:     Discharge Instructions         PHYSICIAN ORDERS AND DISCHARGE INSTRUCTIONS     NOTE: Upon discharge from the [address], you will receive a patient experience survey. We would be grateful if you would take the time to fill this survey out.     Wound care order history:                 CESAR's   Right       Left               Date:              Cultures:                Grafts:                Antibiotics:           Continuing wound care orders and information:                 Residence:                Continue home health care with:               Your wound-care supplies will be provided by:      Wound cleansing:               Do not scrub or use excessive force. Wash hands with soap and water before and after dressing changes. Prior to applying a clean dressing, cleanse wound with normal saline, wound cleanser, or mild soap and water. Ask the physician or nurse before getting the wound(s) wet in a shower     Daily Wound management:              Keep weight off wounds and reposition every 2 hours. Avoid standing for long periods of time. Apply wraps/stockings in AM and remove at bedtime. If swelling is present, elevate legs to the level of the heart or above for 30 minutes 4-5 times a day and/or when sitting. When taking antibiotics take entire prescription as ordered by physician do not stop taking until medicine is all gone.                                  Orders for this week:  12/16/20                Right Arm-- Clean with soap and water. Pat Dry. Cover with Border Gauze. Change Daily     DC Wound Vac 12/9/20     Discharge today from  the wound care center  Call [phone] for any questions or concerns.   Date__________   Time____________             Treatment Note      Written Patient Dismissal Instructions Given            Electronically signed by Tej Borja MD on 12/16/2020 at 2:19 PM

## 2021-01-01 NOTE — ED PROVIDER NOTES
Emergency Department Encounter    Patient: Gil Vergara  MRN: [id number]  : 1985  Date of Evaluation: 10/31/2020  ED Provider:  Troy Oconnor     Patient was signed out to me by off going provider Dr. Shama Garrett. I discussed the patient's presentation work-up, findings and plan of care at the end of her shift. A CT of the left upper extremity was pending. Concern was for a large abscess that would need incision and drainage in the OR. Briefly, Gil Vergara is a 37 y.o. male presented to the emergency department with left forearm pain and swelling x3 days. History of IV drug use/abuse.     I have reviewed and interpreted all of the currently available lab results from this visit (if applicable)  Results for orders placed or performed during the hospital encounter of 10/30/20   CBC Auto Differential   Result Value Ref Range    WBC 13.4 (H) 4.0 - 10.5 K/CU MM    RBC 4.64 4.6 - 6.2 M/CU MM    Hemoglobin 12.0 (L) 13.5 - 18.0 GM/DL    Hematocrit 37.6 (L) 42 - 52 %    MCV 81.0 78 - 100 FL    MCH 25.9 (L) 27 - 31 PG    MCHC 31.9 (L) 32.0 - 36.0 %    RDW 14.5 11.7 - 14.9 %    Platelets 539 233 - 882 K/CU MM    MPV 10.0 7.5 - 11.1 FL    Differential Type AUTOMATED DIFFERENTIAL     Segs Relative 76.6 (H) 36 - 66 %    Lymphocytes % 16.3 (L) 24 - 44 %    Monocytes % 5.7 (H) 0 - 4 %    Eosinophils % 0.7 0 - 3 %    Basophils % 0.2 0 - 1 %    Segs Absolute 10.3 K/CU MM    Lymphocytes Absolute 2.2 K/CU MM    Monocytes Absolute 0.8 K/CU MM    Eosinophils Absolute 0.1 K/CU MM    Basophils Absolute 0.0 K/CU MM    Nucleated RBC % 0.0 %    Total Nucleated RBC 0.0 K/CU MM    Total Immature Neutrophil 0.07 K/CU MM    Immature Neutrophil % 0.5 (H) 0 - 0.43 %   Basic Metabolic Panel w/ Reflex to MG   Result Value Ref Range    Sodium 135 135 - 145 MMOL/L    Potassium 3.6 3.5 - 5.1 MMOL/L    Chloride 93 (L) 99 - 110 mMol/L    CO2 29 21 - 32 MMOL/L    Anion Gap 13 4 - 16    BUN 10 6 - 23 MG/DL    CREATININE 0.7 (L) 0.9 - 1.3 MG/DL    Glucose 103 (H) 70 - 99 MG/DL    Calcium 9.7 8.3 - 10.6 MG/DL    GFR Non-African American >60 >60 mL/min/1.73m2    GFR African American >60 >60 mL/min/1.73m2   Lactic Acid, Plasma   Result Value Ref Range    Lactate 1.8 0.4 - 2.0 mMOL/L   Hepatic function panel   Result Value Ref Range    Alb 4.2 3.4 - 5.0 GM/DL    Total Bilirubin 0.8 0.0 - 1.0 MG/DL    Bilirubin, Direct 0.3 0.0 - 0.3 MG/DL    Bilirubin, Indirect 0.5 0 - 0.7 MG/DL    Alkaline Phosphatase 92 40 - 129 IU/L    AST 13 (L) 15 - 37 IU/L    ALT 9 (L) 10 - 40 U/L    Total Protein 8.1 6.4 - 8.2 GM/DL   COVID-19    Specimen: Nasopharyngeal Swab   Result Value Ref Range    Source THROAT     SARS-CoV-2, NAAT NOT DETECTED       Radiographs (if obtained):    [] Radiologist's Report Reviewed:  CTA UPPER EXTREMITY LEFT W CONTRAST   Final Result   10 cm abscess within the left upper extremity anterior compartment overlying   the proximal ulna. Surrounding soft tissue edema/cellulitis. No soft tissue   gas, elbow joint effusion or evidence of osteomyelitis. Reactive left axillary lymphadenopathy. /76   Pulse 86   Temp 98.1 °F (36.7 °C)   Resp 16   Ht 6' 1\" (1.854 m)   Wt 190 lb (86.2 kg)   SpO2 98%   BMI 25.07 kg/m²     General: In no apparent distress  Left forearm: 8 cm x 8 cm area of erythema and edema of the left forearm. No area of fluctuance. Incision and Drainage Procedure Note    Indication: Abscess    Procedure: The patient was positioned appropriately and the skin over the incision site was prepped with alcohol. Local anesthesia was obtained by infiltration using 1% Lidocaine without epinephrine. An incision was then made over the apex of the lesion and 60 cc of thick, foul smelling and serosanguineous material was expressed. Loculations were broken up using forceps and more of the material was able to be expressed. The drainage cavity was then irrigated.  The patients tetanus status was unknown and a booster dose was given. The patient tolerated the procedure well. Complications: None    Medications   sodium chloride flush 0.9 % injection 10 mL (10 mLs Intravenous Given 10/30/20 2223)   vancomycin (VANCOCIN) 1,500 mg in dextrose 5 % 500 mL IVPB (0 mg Intravenous Stopped 10/31/20 0049)   piperacillin-tazobactam (ZOSYN) 4.5 g in dextrose 5 % 100 mL IVPB (mini-bag) (0 g Intravenous Stopped 10/30/20 2223)   iopamidol (ISOVUE-370) 76 % injection 80 mL (80 mLs Intravenous Given 10/30/20 2218)       MDM:  I discussed patient with off going provider whose plan of care at that time was to admit the patient and consult surgery for incision and drainage in the OR. While discussing plan of care with patient he adamantly declined admission stating that he had to be home tomorrow morning to take his kids to school. Patient said there is no way that he would stay. Due to patient wanting to leave and the large abscess of the left forearm I decided to attempt incision and drainage in the emergency department. Approximately 60 cc of thick, foul, smelling purulent material was removed from the left forearm. Will cover patient with doxycycline. Strict ED return precautions were given. Discussed signs of worsening infection which included but was not limited to worsening redness, worsening pain, worsening swelling and fever. Patient follows with the [facility] and will go on Monday morning for follow-up/wound care/wound reevaluation. Patient acknowledged understanding and agreement with plan of care. He was discharged in good condition with stable vitals. Clinical Impression:  1. Cellulitis and abscess of upper extremity      Disposition referral (if applicable):  [address]  [phone]  Go to   [facility] for close follow-up of your left arm/forearm abscess and cellulitis.     Disposition medications (if applicable):  Discharge Medication List as of 10/31/2020  2:22 AM START taking these medications    Details   doxycycline hyclate (VIBRA-TABS) 100 MG tablet Take 1 tablet by mouth 2 times daily for 10 days, Disp-20 tablet,R-0Print             Comment: Please note this report has been produced using speech recognition software and may contain errors related to that system including errors in grammar, punctuation, and spelling, as well as words and phrases that may be inappropriate. Efforts were made to edit the dictations.         Eunice Diaz MD  10/31/20 6340 Passed

## 2022-06-03 ENCOUNTER — HOSPITAL ENCOUNTER (EMERGENCY)
Age: 37
Discharge: HOME | End: 2022-06-03
Attending: EMERGENCY MEDICINE
Payer: OTHER GOVERNMENT

## 2022-06-03 VITALS
RESPIRATION RATE: 19 BRPM | TEMPERATURE: 97.8 F | HEART RATE: 72 BPM | DIASTOLIC BLOOD PRESSURE: 57 MMHG | SYSTOLIC BLOOD PRESSURE: 101 MMHG | OXYGEN SATURATION: 100 %

## 2022-06-03 DIAGNOSIS — T40.601A OPIATE OVERDOSE, ACCIDENTAL OR UNINTENTIONAL, INITIAL ENCOUNTER (HCC): Primary | ICD-10-CM

## 2022-06-03 PROCEDURE — 93005 ELECTROCARDIOGRAM TRACING: CPT | Performed by: EMERGENCY MEDICINE

## 2022-06-03 PROCEDURE — 99284 EMERGENCY DEPT VISIT MOD MDM: CPT

## 2022-06-03 ASSESSMENT — PAIN - FUNCTIONAL ASSESSMENT: PAIN_FUNCTIONAL_ASSESSMENT: NONE - DENIES PAIN

## 2022-06-03 NOTE — PROGRESS NOTES
Patient left AMA. Patient signed form. Patient given OD kit. Patient alert and oriented x4. Physician aware.

## 2022-06-03 NOTE — ED TRIAGE NOTES
Pt presents to ED by [facility] for overdose. Pt was found in his parked car unresponsive and administered 8mg narcan. Pt arrived to ED alert and oriented.  Pt denies SI/HI, states \" I just wanted to get high\"

## 2022-06-03 NOTE — ED PROVIDER NOTES
Emergency Department Encounter    Patient: Valarie Montgomery  MRN: [id number]  : 1985  Date of Evaluation: 6/3/2022  ED Provider:  Faustino Segovia MD    Triage Chief Complaint:   Drug Overdose    [place]:  Valarie Montgomery is a 31 y.o. male that presents via EMS, by [facility] for concern for an overdose. Was found in his parked car unresponsive. They administered 8 mg of Narcan. Arrived alert and oriented. He denies that he was trying to harm himself and stated that he wanted to get high to nursing staff. To me he does not want to answer questions. He denies pain. No nausea or vomiting. He told the police officers that he snorted fentanyl, does have drug residue at his nose.   He typically shoots up      ROS - see HPI, below listed is current ROS at time of my eval:  10 systems reviewed negative except as above      Past Medical History:   Diagnosis Date    Herniated intervertebral disc of lumbar spine     lumbar    IV drug abuse ([address]     currently taking suboxone    Sciatica      Past Surgical History:   Procedure Laterality Date    ABDOMEN SURGERY      bowel surgery as a      ABDOMEN SURGERY N/A 2020    ARM INCISION AND DRAINAGE AND WOUND VAC APPLICATION performed by Liset Beck MD at [facility] OR     Family History   Problem Relation Age of Onset    High Blood Pressure Father     Alcohol Abuse Father     Heart Disease Brother     Alcohol Abuse Maternal Grandmother     Alcohol Abuse Maternal Grandfather     Heart Disease Paternal Grandfather     Anxiety Disorder Brother      Social History     Socioeconomic History    Marital status: Single     Spouse name: Not on file    Number of children: Not on file    Years of education: Not on file    Highest education level: Not on file   Occupational History    Not on file   Tobacco Use    Smoking status: Current Every Day Smoker     Packs/day: 1.00     Types: Cigarettes    Smokeless tobacco: Never Used   Substance and Sexual Activity    Alcohol use: No    Drug use: Yes     Types: Marijuana (Weed), IV, Opiates      Comment: heroin  1.5 gram daily, pt states last use 4-5 days ago     Sexual activity: Yes     Partners: Female   Other Topics Concern    Not on file   Social History Narrative    Not on file     Social Determinants of Health     Financial Resource Strain:     Difficulty of Paying Living Expenses: Not on file   Food Insecurity:     Worried About Running Out of Food in the Last Year: Not on file    [place] of Food in the Last Year: Not on file   Transportation Needs:     Lack of Transportation (Medical): Not on file    Lack of Transportation (Non-Medical): Not on file   Physical Activity:     Days of Exercise per Week: Not on file    Minutes of Exercise per Session: Not on file   Stress:     Feeling of Stress : Not on file   Social Connections:     Frequency of Communication with Friends and Family: Not on file    Frequency of Social Gatherings with Friends and Family: Not on file    Attends Confucianism Services: Not on file    Active Member of [address] [place] [address] or Organizations: Not on file    Attends Club or Organization Meetings: Not on file    Marital Status: Not on file   Intimate Partner Violence:     Fear of Current or Ex-Partner: Not on file    Emotionally Abused: Not on file    Physically Abused: Not on file    Sexually Abused: Not on file   Housing Stability:     Unable to Pay for Housing in the Last Year: Not on file    Number of [place] in the Last Year: Not on file    Unstable Housing in the Last Year: Not on file     Current Facility-Administered Medications   Medication Dose Route Frequency Provider Last Rate Last Admin    NALOXONE OPIATE OVERDOSE KIT 1 each  1 kit Nasal Once Michele Lopez MD         Current Outpatient Medications   Medication Sig Dispense Refill    gabapentin (NEURONTIN) 800 MG tablet Take 1 tablet by mouth 4 times daily for 7 days.  28 tablet 0    buprenorphine-naloxone (SUBOXONE) 8-2 MG SUBL SL tablet Place 1 tablet under the tongue three times daily.  esomeprazole Magnesium (NEXIUM) 40 MG PACK Take 40 mg by mouth daily      sertraline (ZOLOFT) 100 MG tablet Take 200 mg by mouth daily       Allergies   Allergen Reactions    Banana Swelling    Ceclor [Cefaclor] Rash    Amoxicillin Diarrhea       Nursing Notes Reviewed    Physical Exam:  ED Triage Vitals   Enc Vitals Group      BP 06/03/22 0900 127/79      Heart Rate 06/03/22 0900 69      Resp 06/03/22 0900 19      Temp 06/03/22 0949 97.8 °F (36.6 °C)      Temp Source 06/03/22 0949 Oral      SpO2 06/03/22 0900 100 %      Weight --       Height --       Head Circumference --       Peak Flow --       Pain Score --       Pain Loc --       Pain Edu? --       Excl. in [address]? --        My pulse ox interpretation is - normal    General appearance:  No acute distress. Skin:  Warm. Dry. Eye:  Extraocular movements intact. Ears, nose, mouth and throat:  Oral mucosa moist, left nostril with crusting and powder residue. Neck:  Trachea midline. Extremity:  No swelling. Normal ROM     Heart:  Regular rate and rhythm, normal S1 & S2, no extra heart sounds. Perfusion:  intact  Respiratory:  Lungs clear to auscultation bilaterally. Respirations nonlabored. Abdominal:  Soft. Nontender. Non distended.   Neurological:  Alert and oriented          Psychiatric:  Appropriate    I have reviewed and interpreted all of the currently available lab results from this visit (if applicable):  Results for orders placed or performed during the hospital encounter of 06/03/22   EKG 12 Lead   Result Value Ref Range    Ventricular Rate 49 BPM    Atrial Rate 49 BPM    P-R Interval 176 ms    QRS Duration 100 ms    Q-T Interval 460 ms    QTc Calculation (Bazett) 415 ms    P Axis 79 degrees    R Axis 74 degrees    T Axis 73 degrees    Diagnosis       Sinus bradycardia with marked sinus arrhythmia  Minimal voltage criteria for LVH, may be normal variant  Borderline ECG  When compared with ECG of 29-NOV-2020 07:12,  No significant change was found        Radiographs (if obtained):  Radiologist's Report Reviewed:  No results found. EKG (if obtained): (All EKG's are interpreted by myself in the absence of a cardiologist)  Sinus bradycardia with sinus arrhythmia, rate of 39 bpm.  Normal intervals. No ST elevation. No previous to compare      MDM:  57-year-old male with history as above presents with concern for opiate overdose. He did admit to using opiates, initially did not want to talk to me but on reassessments was willing to talk to me. He denies all complaints. He did fall asleep, and did require 2 L nasal cannula while sleeping but woke easily and was able to ambulate around the department and not in any distress, not requiring oxygen. We monitored him for over 3 hours, he is doing well, plan will be for discharge. Given Narcan to go. Discharged in stable condition. Given resources for drug rehab, all questions answered    Clinical Impression:  1. Opiate overdose, accidental or unintentional, initial encounter [facility])      Disposition referral (if applicable):  your doctor          Disposition medications (if applicable):  Discharge Medication List as of 6/3/2022  1:15 PM        ED Provider Disposition Time  DISPOSITION Decision To Discharge 06/03/2022 12:24:26 PM      Comment: Please note this report has been produced using speech recognition software and may contain errors related to that system including errors in grammar, punctuation, and spelling, as well as words and phrases that may be inappropriate. Efforts were made to edit the dictations.         Batool Anderson MD  06/03/22 2125

## 2022-06-04 LAB
EKG ATRIAL RATE: 49 BPM
EKG DIAGNOSIS: NORMAL
EKG P AXIS: 79 DEGREES
EKG P-R INTERVAL: 176 MS
EKG Q-T INTERVAL: 460 MS
EKG QRS DURATION: 100 MS
EKG QTC CALCULATION (BAZETT): 415 MS
EKG R AXIS: 74 DEGREES
EKG T AXIS: 73 DEGREES
EKG VENTRICULAR RATE: 49 BPM

## 2022-06-04 PROCEDURE — 93010 ELECTROCARDIOGRAM REPORT: CPT | Performed by: INTERNAL MEDICINE

## 2023-04-02 ENCOUNTER — HOSPITAL ENCOUNTER (EMERGENCY)
Age: 38
Discharge: HOME | End: 2023-04-02
Payer: OTHER GOVERNMENT

## 2023-04-02 VITALS
OXYGEN SATURATION: 96 % | TEMPERATURE: 98.4 F | HEART RATE: 61 BPM | RESPIRATION RATE: 16 BRPM | SYSTOLIC BLOOD PRESSURE: 113 MMHG | DIASTOLIC BLOOD PRESSURE: 72 MMHG

## 2023-04-02 DIAGNOSIS — K04.7 DENTAL ABSCESS: ICD-10-CM

## 2023-04-02 DIAGNOSIS — R22.0 RIGHT FACIAL SWELLING: ICD-10-CM

## 2023-04-02 DIAGNOSIS — K08.89 TOOTHACHE: Primary | ICD-10-CM

## 2023-04-02 PROCEDURE — 41800 DRAINAGE OF GUM LESION: CPT

## 2023-04-02 PROCEDURE — 2500000003 HC RX 250 WO HCPCS: Performed by: PHYSICIAN ASSISTANT

## 2023-04-02 PROCEDURE — 99283 EMERGENCY DEPT VISIT LOW MDM: CPT

## 2023-04-02 PROCEDURE — 6370000000 HC RX 637 (ALT 250 FOR IP): Performed by: PHYSICIAN ASSISTANT

## 2023-04-02 RX ORDER — LIDOCAINE HYDROCHLORIDE 20 MG/ML
15 SOLUTION OROPHARYNGEAL ONCE
Status: COMPLETED | OUTPATIENT
Start: 2023-04-02 | End: 2023-04-02

## 2023-04-02 RX ORDER — MORPHINE SULFATE 15 MG/1
15 TABLET ORAL EVERY 6 HOURS PRN
Qty: 8 TABLET | Refills: 0 | Status: SHIPPED | OUTPATIENT
Start: 2023-04-02 | End: 2023-04-04

## 2023-04-02 RX ORDER — CLINDAMYCIN HYDROCHLORIDE 150 MG/1
450 CAPSULE ORAL ONCE
Status: COMPLETED | OUTPATIENT
Start: 2023-04-02 | End: 2023-04-02

## 2023-04-02 RX ORDER — BUPIVACAINE HYDROCHLORIDE 5 MG/ML
5 INJECTION, SOLUTION EPIDURAL; INTRACAUDAL ONCE
Status: COMPLETED | OUTPATIENT
Start: 2023-04-02 | End: 2023-04-02

## 2023-04-02 RX ADMIN — LIDOCAINE HYDROCHLORIDE 15 ML: 20 SOLUTION ORAL at 19:10

## 2023-04-02 RX ADMIN — CLINDAMYCIN HYDROCHLORIDE 450 MG: 150 CAPSULE ORAL at 19:09

## 2023-04-02 RX ADMIN — BUPIVACAINE HYDROCHLORIDE 25 MG: 5 INJECTION, SOLUTION EPIDURAL; INTRACAUDAL; PERINEURAL at 19:10

## 2023-04-02 NOTE — ED PROVIDER NOTES
results found. PROCEDURES:   Dental Nerve Block    Date/Time: 4/2/2023 7:37 PM  Performed by: Nathalia Black PA-C  Authorized by: Nathalia Black PA-C     Consent:     Consent obtained:  Verbal    Consent given by:  Patient    Risks, benefits, and alternatives were discussed: yes      Risks discussed:  Infection, unsuccessful block, pain, hematoma and intravascular injection    Alternatives discussed:  Alternative treatment and no treatment  Universal protocol:     Procedure explained and questions answered to patient or proxy's satisfaction: yes      Patient identity confirmed:  Verbally with patient  Indications:     Indications: dental pain    Location:     Block type: Inferior alveolar  Procedure details:     Topical anesthetic:  Lidocaine gel    Needle gauge: 22g.     Anesthetic injected:  Lidocaine 1% w/o epi and bupivacaine 0.5% w/o epi (3cc)    Injection procedure:  Anatomic landmarks identified, introduced needle, negative aspiration for blood and anatomic landmarks palpated  Post-procedure details:     Outcome:  Pain improved    Procedure completion:  Tolerated well, no immediate complications  Incision/Drainage    Date/Time: 4/2/2023 7:40 PM  Performed by: Nathalia Black PA-C  Authorized by: Nathalia Black PA-C     Universal protocol:     Patient identity confirmed:  Verbally with patient  Location:     Type:  Abscess    Location:  Mouth    Mouth location:  Submandibular space  Sedation:     Sedation type:  None  Anesthesia:     Anesthesia method:  Nerve block    Block needle gauge:  25 G    Block anesthetic:  Lidocaine 1% w/o epi and bupivacaine 0.5% w/o epi    Block injection procedure:  Anatomic landmarks identified, incremental injection and negative aspiration for blood    Block outcome:  Anesthesia achieved  Procedure type:     Complexity:  Simple  Procedure details:     Needle aspiration: yes      Needle size:  20 G    Incision types:  Stab incision    Wound management:  Irrigated

## 2023-04-02 NOTE — DISCHARGE INSTRUCTIONS
Please call your dental provider  to schedule an appointment for reevaluation this week or next for reevaluation of your symptoms, to discuss your visit to the ED and for definitive evaluation and treatment of your dental pain. For pain relief you can use over the counter medications such as ibuprofen (e.g. Advil, Motrin) or acetaminophen (e.g. Tylenol). Use ibuprofen taking 600mg every 6 hours. If  you need additional pain relief add over the counter tylenol, taking 1000 mg every 6 hours. Do not use these if you have any allergies or contraindications to these medications. If you need additional pain relief you can use the prescription pain medications. Return  to Emergency Department with any facial swelling, throat swelling, difficulty breathing, difficulty swallowing, fevers, neck pain, drooling,fevers over 100.4,  if you are unable to close or open your mouth, worsening symptoms or any new concerns. If needed, Dental Resources: [employer] [facility]s walk in [facility]   [address] [place] [address],[address], [address],  Isabela Lincoln  [phone]  [email or web address]

## 2023-04-03 ENCOUNTER — TELEPHONE (OUTPATIENT)
Dept: PHARMACY | Age: 38
End: 2023-04-03

## 2023-04-03 RX ORDER — CLINDAMYCIN HYDROCHLORIDE 150 MG/1
450 CAPSULE ORAL 3 TIMES DAILY
Qty: 90 CAPSULE | Refills: 0 | Status: SHIPPED | OUTPATIENT
Start: 2023-04-03 | End: 2023-04-13

## 2023-04-03 NOTE — TELEPHONE ENCOUNTER
Pharmacy Note  ED Culture Follow-up    To May is a 30 y.o. male. Allergies: Banana, Ceclor [cefaclor], and Amoxicillin     Labs:  Lab Results   Component Value Date    BUN 8 11/30/2020    CREATININE 0.6 (L) 11/30/2020    WBC 7.9 11/30/2020     CrCl cannot be calculated (Patient's most recent lab result is older than the maximum 180 days allowed. ). Patient called that prescription for clindamycin was not received by the pharmacy following ED visit on 4/2/23. Spoke with Dr. Medardo Camejo who recommended clindamycin 450 mg by mouth three times daily for 10 days. Prescription sent to [facility]. Please call with any questions.  Clark Fernandez, [facility], PharmD 4:02 PM 4/3/2023

## 2023-04-03 NOTE — ED NOTES
Pt discharged from ED at this time with all necessary paperwork. All questions answered at this time and discharge instructions reviewed. Pt ambulates to waiting room.        Melissa Mansfield RN  04/02/23 2002

## 2023-04-05 ASSESSMENT — ENCOUNTER SYMPTOMS
SHORTNESS OF BREATH: 0
TROUBLE SWALLOWING: 0
VOICE CHANGE: 0
COUGH: 0
FACIAL SWELLING: 1

## 2023-06-14 ENCOUNTER — APPOINTMENT (OUTPATIENT)
Dept: GENERAL RADIOLOGY | Age: 38
End: 2023-06-14
Payer: OTHER GOVERNMENT

## 2023-06-14 ENCOUNTER — HOSPITAL ENCOUNTER (EMERGENCY)
Age: 38
Discharge: HOME | End: 2023-06-14
Attending: EMERGENCY MEDICINE
Payer: OTHER GOVERNMENT

## 2023-06-14 VITALS
TEMPERATURE: 97.5 F | RESPIRATION RATE: 18 BRPM | DIASTOLIC BLOOD PRESSURE: 91 MMHG | HEART RATE: 84 BPM | OXYGEN SATURATION: 99 % | SYSTOLIC BLOOD PRESSURE: 142 MMHG | BODY MASS INDEX: 18.55 KG/M2 | WEIGHT: 140 LBS | HEIGHT: 73 IN

## 2023-06-14 DIAGNOSIS — R07.89 RIGHT-SIDED CHEST WALL PAIN: Primary | ICD-10-CM

## 2023-06-14 PROCEDURE — 71101 X-RAY EXAM UNILAT RIBS/CHEST: CPT

## 2023-06-14 PROCEDURE — 99283 EMERGENCY DEPT VISIT LOW MDM: CPT

## 2023-06-14 PROCEDURE — 6370000000 HC RX 637 (ALT 250 FOR IP): Performed by: EMERGENCY MEDICINE

## 2023-06-14 RX ORDER — KETOROLAC TROMETHAMINE 10 MG/1
10 TABLET, FILM COATED ORAL EVERY 6 HOURS PRN
Qty: 20 TABLET | Refills: 0 | Status: SHIPPED | OUTPATIENT
Start: 2023-06-14 | End: 2023-06-19

## 2023-06-14 RX ORDER — IBUPROFEN 600 MG/1
600 TABLET ORAL EVERY 6 HOURS PRN
Qty: 20 TABLET | Refills: 0 | Status: SHIPPED | OUTPATIENT
Start: 2023-06-14 | End: 2023-06-14 | Stop reason: ALTCHOICE

## 2023-06-14 RX ORDER — IBUPROFEN 600 MG/1
600 TABLET ORAL
Status: COMPLETED | OUTPATIENT
Start: 2023-06-14 | End: 2023-06-14

## 2023-06-14 RX ORDER — OXYCODONE HYDROCHLORIDE AND ACETAMINOPHEN 5; 325 MG/1; MG/1
1 TABLET ORAL
Status: COMPLETED | OUTPATIENT
Start: 2023-06-14 | End: 2023-06-14

## 2023-06-14 RX ORDER — OXYCODONE HYDROCHLORIDE AND ACETAMINOPHEN 5; 325 MG/1; MG/1
1 TABLET ORAL EVERY 6 HOURS PRN
Qty: 12 TABLET | Refills: 0 | Status: SHIPPED | OUTPATIENT
Start: 2023-06-14 | End: 2023-06-14 | Stop reason: ALTCHOICE

## 2023-06-14 RX ORDER — LIDOCAINE 4 G/G
1 PATCH TOPICAL ONCE
Status: DISCONTINUED | OUTPATIENT
Start: 2023-06-14 | End: 2023-06-14 | Stop reason: HOSPADM

## 2023-06-14 RX ORDER — LIDOCAINE 50 MG/G
1 PATCH TOPICAL DAILY
Qty: 30 PATCH | Refills: 0 | Status: SHIPPED | OUTPATIENT
Start: 2023-06-14 | End: 2023-07-14

## 2023-06-14 RX ADMIN — IBUPROFEN 600 MG: 600 TABLET ORAL at 12:43

## 2023-06-14 RX ADMIN — OXYCODONE HYDROCHLORIDE AND ACETAMINOPHEN 1 TABLET: 5; 325 TABLET ORAL at 12:43

## 2023-06-14 ASSESSMENT — PAIN DESCRIPTION - FREQUENCY: FREQUENCY: INTERMITTENT

## 2023-06-14 ASSESSMENT — PAIN DESCRIPTION - LOCATION
LOCATION: RIB CAGE
LOCATION: RIB CAGE

## 2023-06-14 ASSESSMENT — PAIN - FUNCTIONAL ASSESSMENT: PAIN_FUNCTIONAL_ASSESSMENT: 0-10

## 2023-06-14 ASSESSMENT — PAIN DESCRIPTION - ORIENTATION
ORIENTATION: RIGHT
ORIENTATION: RIGHT

## 2023-06-14 ASSESSMENT — PAIN SCALES - GENERAL: PAINLEVEL_OUTOF10: 9

## 2023-06-14 ASSESSMENT — LIFESTYLE VARIABLES
HOW OFTEN DO YOU HAVE A DRINK CONTAINING ALCOHOL: NEVER
HOW MANY STANDARD DRINKS CONTAINING ALCOHOL DO YOU HAVE ON A TYPICAL DAY: PATIENT DOES NOT DRINK

## 2023-06-14 ASSESSMENT — PAIN DESCRIPTION - PAIN TYPE: TYPE: ACUTE PAIN

## 2023-06-14 ASSESSMENT — PAIN DESCRIPTION - DESCRIPTORS: DESCRIPTORS: ACHING;STABBING
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
## 2023-06-16 ENCOUNTER — APPOINTMENT (OUTPATIENT)
Dept: CT IMAGING | Age: 38
End: 2023-06-16
Payer: OTHER GOVERNMENT

## 2023-06-16 ENCOUNTER — HOSPITAL ENCOUNTER (EMERGENCY)
Age: 38
Discharge: HOME | End: 2023-06-16
Attending: EMERGENCY MEDICINE
Payer: OTHER GOVERNMENT

## 2023-06-16 ENCOUNTER — APPOINTMENT (OUTPATIENT)
Dept: GENERAL RADIOLOGY | Age: 38
End: 2023-06-16
Payer: OTHER GOVERNMENT

## 2023-06-16 VITALS
TEMPERATURE: 98 F | HEIGHT: 72 IN | HEART RATE: 95 BPM | BODY MASS INDEX: 18.96 KG/M2 | WEIGHT: 140 LBS | OXYGEN SATURATION: 97 % | SYSTOLIC BLOOD PRESSURE: 122 MMHG | DIASTOLIC BLOOD PRESSURE: 95 MMHG | RESPIRATION RATE: 18 BRPM

## 2023-06-16 DIAGNOSIS — K25.3: Primary | ICD-10-CM

## 2023-06-16 LAB
ALBUMIN SERPL-MCNC: 4.2 GM/DL (ref 3.4–5)
ALP BLD-CCNC: 70 IU/L (ref 40–129)
ALT SERPL-CCNC: <5 U/L (ref 10–40)
ANION GAP SERPL CALCULATED.3IONS-SCNC: 11 MMOL/L (ref 4–16)
AST SERPL-CCNC: 9 IU/L (ref 15–37)
BASOPHILS ABSOLUTE: 0 K/CU MM
BASOPHILS RELATIVE PERCENT: 0.4 % (ref 0–1)
BILIRUB SERPL-MCNC: 0.3 MG/DL (ref 0–1)
BUN SERPL-MCNC: 8 MG/DL (ref 6–23)
CALCIUM SERPL-MCNC: 8.9 MG/DL (ref 8.3–10.6)
CHLORIDE BLD-SCNC: 101 MMOL/L (ref 99–110)
CO2: 27 MMOL/L (ref 21–32)
CREAT SERPL-MCNC: 0.7 MG/DL (ref 0.9–1.3)
D DIMER: 253 NG/ML(DDU)
DIFFERENTIAL TYPE: ABNORMAL
EOSINOPHILS ABSOLUTE: 0.2 K/CU MM
EOSINOPHILS RELATIVE PERCENT: 1.5 % (ref 0–3)
GFR SERPL CREATININE-BSD FRML MDRD: >60 ML/MIN/1.73M2
GLUCOSE SERPL-MCNC: 90 MG/DL (ref 70–99)
HCT VFR BLD CALC: 41.9 % (ref 42–52)
HEMOGLOBIN: 13.6 GM/DL (ref 13.5–18)
IMMATURE NEUTROPHIL %: 0.2 % (ref 0–0.43)
LACTATE: 0.9 MMOL/L (ref 0.5–1.9)
LIPASE: 25 IU/L (ref 13–60)
LYMPHOCYTES ABSOLUTE: 2.3 K/CU MM
LYMPHOCYTES RELATIVE PERCENT: 23.6 % (ref 24–44)
MCH RBC QN AUTO: 26.5 PG (ref 27–31)
MCHC RBC AUTO-ENTMCNC: 32.5 % (ref 32–36)
MCV RBC AUTO: 81.7 FL (ref 78–100)
MONOCYTES ABSOLUTE: 0.6 K/CU MM
MONOCYTES RELATIVE PERCENT: 6.2 % (ref 0–4)
NUCLEATED RBC %: 0 %
PDW BLD-RTO: 14.4 % (ref 11.7–14.9)
PLATELET # BLD: 266 K/CU MM (ref 140–440)
PMV BLD AUTO: 9.8 FL (ref 7.5–11.1)
POTASSIUM SERPL-SCNC: 3.6 MMOL/L (ref 3.5–5.1)
RBC # BLD: 5.13 M/CU MM (ref 4.6–6.2)
SEGMENTED NEUTROPHILS ABSOLUTE COUNT: 6.7 K/CU MM
SEGMENTED NEUTROPHILS RELATIVE PERCENT: 68.1 % (ref 36–66)
SODIUM BLD-SCNC: 139 MMOL/L (ref 135–145)
TOTAL IMMATURE NEUTOROPHIL: 0.02 K/CU MM
TOTAL NUCLEATED RBC: 0 K/CU MM
TOTAL PROTEIN: 6.9 GM/DL (ref 6.4–8.2)
TROPONIN T: <0.01 NG/ML
WBC # BLD: 9.8 K/CU MM (ref 4–10.5)

## 2023-06-16 PROCEDURE — 6360000004 HC RX CONTRAST MEDICATION: Performed by: PHYSICIAN ASSISTANT

## 2023-06-16 PROCEDURE — 85025 COMPLETE CBC W/AUTO DIFF WBC: CPT

## 2023-06-16 PROCEDURE — 6360000002 HC RX W HCPCS: Performed by: PHYSICIAN ASSISTANT

## 2023-06-16 PROCEDURE — 84484 ASSAY OF TROPONIN QUANT: CPT

## 2023-06-16 PROCEDURE — 93005 ELECTROCARDIOGRAM TRACING: CPT | Performed by: PHYSICIAN ASSISTANT

## 2023-06-16 PROCEDURE — 85379 FIBRIN DEGRADATION QUANT: CPT

## 2023-06-16 PROCEDURE — 74177 CT ABD & PELVIS W/CONTRAST: CPT

## 2023-06-16 PROCEDURE — 83690 ASSAY OF LIPASE: CPT

## 2023-06-16 PROCEDURE — 76937 US GUIDE VASCULAR ACCESS: CPT

## 2023-06-16 PROCEDURE — 96375 TX/PRO/DX INJ NEW DRUG ADDON: CPT

## 2023-06-16 PROCEDURE — 80053 COMPREHEN METABOLIC PANEL: CPT

## 2023-06-16 PROCEDURE — 71275 CT ANGIOGRAPHY CHEST: CPT

## 2023-06-16 PROCEDURE — 6370000000 HC RX 637 (ALT 250 FOR IP): Performed by: PHYSICIAN ASSISTANT

## 2023-06-16 PROCEDURE — 83605 ASSAY OF LACTIC ACID: CPT

## 2023-06-16 PROCEDURE — 96374 THER/PROPH/DIAG INJ IV PUSH: CPT

## 2023-06-16 PROCEDURE — 2580000003 HC RX 258: Performed by: PHYSICIAN ASSISTANT

## 2023-06-16 PROCEDURE — 99285 EMERGENCY DEPT VISIT HI MDM: CPT

## 2023-06-16 PROCEDURE — 71046 X-RAY EXAM CHEST 2 VIEWS: CPT

## 2023-06-16 RX ORDER — MAGNESIUM HYDROXIDE/ALUMINUM HYDROXICE/SIMETHICONE 120; 1200; 1200 MG/30ML; MG/30ML; MG/30ML
30 SUSPENSION ORAL ONCE
Status: COMPLETED | OUTPATIENT
Start: 2023-06-16 | End: 2023-06-16

## 2023-06-16 RX ORDER — KETOROLAC TROMETHAMINE 15 MG/ML
15 INJECTION, SOLUTION INTRAMUSCULAR; INTRAVENOUS ONCE
Status: COMPLETED | OUTPATIENT
Start: 2023-06-16 | End: 2023-06-16

## 2023-06-16 RX ORDER — 0.9 % SODIUM CHLORIDE 0.9 %
1000 INTRAVENOUS SOLUTION INTRAVENOUS ONCE
Status: COMPLETED | OUTPATIENT
Start: 2023-06-16 | End: 2023-06-16

## 2023-06-16 RX ORDER — CALCIUM CARBONATE 500 MG/1
1 TABLET, CHEWABLE ORAL DAILY
Qty: 30 TABLET | Refills: 0 | Status: SHIPPED | OUTPATIENT
Start: 2023-06-16 | End: 2023-07-16

## 2023-06-16 RX ORDER — OMEPRAZOLE 40 MG/1
40 CAPSULE, DELAYED RELEASE ORAL
Qty: 30 CAPSULE | Refills: 0 | Status: SHIPPED | OUTPATIENT
Start: 2023-06-16

## 2023-06-16 RX ORDER — RANITIDINE 150 MG/1
150 TABLET ORAL 2 TIMES DAILY
Qty: 60 TABLET | Refills: 0 | Status: SHIPPED | OUTPATIENT
Start: 2023-06-16

## 2023-06-16 RX ORDER — MORPHINE SULFATE 4 MG/ML
4 INJECTION, SOLUTION INTRAMUSCULAR; INTRAVENOUS ONCE
Status: COMPLETED | OUTPATIENT
Start: 2023-06-16 | End: 2023-06-16

## 2023-06-16 RX ORDER — ONDANSETRON 2 MG/ML
4 INJECTION INTRAMUSCULAR; INTRAVENOUS EVERY 6 HOURS PRN
Status: DISCONTINUED | OUTPATIENT
Start: 2023-06-16 | End: 2023-06-16 | Stop reason: HOSPADM

## 2023-06-16 RX ADMIN — SODIUM CHLORIDE 1000 ML: 9 INJECTION, SOLUTION INTRAVENOUS at 16:11

## 2023-06-16 RX ADMIN — MORPHINE SULFATE 4 MG: 4 INJECTION, SOLUTION INTRAMUSCULAR; INTRAVENOUS at 17:21

## 2023-06-16 RX ADMIN — ALUMINUM HYDROXIDE, MAGNESIUM HYDROXIDE, AND SIMETHICONE 30 ML: 200; 200; 20 SUSPENSION ORAL at 18:41

## 2023-06-16 RX ADMIN — IOPAMIDOL 75 ML: 755 INJECTION, SOLUTION INTRAVENOUS at 17:34

## 2023-06-16 RX ADMIN — KETOROLAC TROMETHAMINE 15 MG: 15 INJECTION, SOLUTION INTRAMUSCULAR; INTRAVENOUS at 15:47

## 2023-06-16 RX ADMIN — ONDANSETRON 4 MG: 2 INJECTION INTRAMUSCULAR; INTRAVENOUS at 17:21

## 2023-06-16 ASSESSMENT — PAIN DESCRIPTION - ORIENTATION
ORIENTATION: RIGHT
ORIENTATION: RIGHT

## 2023-06-16 ASSESSMENT — PAIN SCALES - GENERAL
PAINLEVEL_OUTOF10: 10
PAINLEVEL_OUTOF10: 9

## 2023-06-16 ASSESSMENT — PAIN DESCRIPTION - LOCATION
LOCATION: RIB CAGE

## 2023-06-16 ASSESSMENT — PAIN DESCRIPTION - DESCRIPTORS
DESCRIPTORS: ACHING;STABBING
DESCRIPTORS: SHARP

## 2023-06-16 ASSESSMENT — PAIN DESCRIPTION - PAIN TYPE: TYPE: ACUTE PAIN

## 2023-06-16 ASSESSMENT — PAIN DESCRIPTION - FREQUENCY: FREQUENCY: CONTINUOUS
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
## 2023-06-16 NOTE — ED NOTES
Patient discharging home, AVS reviewed with no questions at this time. Patient instrcuted to follow up per discharge instructions and to return for worsening symptoms. Respirations equal and unlabored, skin PWD.        Zhen Perales RN  06/16/23 [id number]
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
## 2023-06-16 NOTE — CONSULTS
IV Consult complete. Nexiva 20g 1.75\" PIV Inserted in Left Forearm  x 1 attempt using sterile ultrasound guided technique. Brisk blood return, flushes easy.

## 2023-06-16 NOTE — DISCHARGE INSTRUCTIONS
Return to the Emergency Department for bloody vomit or blood in stool or worsening pain or Fever. Otherwise, Call [facility] office to establish care and Dr. Maxwell Celis for an appointment to discuss upper endoscopy.

## 2023-06-19 LAB
EKG ATRIAL RATE: 100 BPM
EKG DIAGNOSIS: NORMAL
EKG P AXIS: 85 DEGREES
EKG P-R INTERVAL: 164 MS
EKG Q-T INTERVAL: 344 MS
EKG QRS DURATION: 86 MS
EKG QTC CALCULATION (BAZETT): 443 MS
EKG R AXIS: 85 DEGREES
EKG T AXIS: 62 DEGREES
EKG VENTRICULAR RATE: 100 BPM

## 2023-06-19 PROCEDURE — 93010 ELECTROCARDIOGRAM REPORT: CPT | Performed by: INTERNAL MEDICINE

## 2023-11-07 NOTE — ACP (ADVANCE CARE PLANNING)
Patient does not have any ACP documents/Medical Power of . LSW notes hospital will follow [place]'s Next of Kin hierarchy in the following descending order for priority:    Guardian  Spouse  [de-identified] of adult Children  Parents  [de-identified] of adult Siblings  Nearest Relative not described above    Per [place] Next of Kin hierarchy: Patients' parent will be [address]. Plan:  1. Labs now: CBC, iron and tibc, ferritin, cmp, cbnp  2. STAT VL DUP Carotid bilateral to assess for blockage   3.  Follow up in January

## 2024-05-21 NOTE — PROGRESS NOTES
[facility] Liaison spoke w/pt & is aware of discharge & will initiate [facility] AT [facility]. Yes

## (undated) DEVICE — TUBING, SUCTION, 9/32" X 10', STRAIGHT: Brand: MEDLINE

## (undated) DEVICE — YANKAUER,FLEXIBLE HANDLE,REGLR CAPACITY: Brand: MEDLINE INDUSTRIES, INC.

## (undated) DEVICE — GLOVE SURG SZ 8 CRM LTX FREE POLYISOPRENE POLYMER BEAD ANTI

## (undated) DEVICE — TRAY PREP DRY W/ PREM GLV 2 APPL 6 SPNG 2 UNDPD 1 OVERWRAP

## (undated) DEVICE — DRAPE,ABDOMINAL,MAJOR,STERILE: Brand: MEDLINE

## (undated) DEVICE — INTENDED FOR TISSUE SEPARATION, AND OTHER PROCEDURES THAT REQUIRE A SHARP SURGICAL BLADE TO PUNCTURE OR CUT.: Brand: BARD-PARKER ® STAINLESS STEEL BLADES

## (undated) DEVICE — SOLUTION IV IRRIG WATER 1000ML POUR BRL 2F7114

## (undated) DEVICE — DRAPE,HAND,STERILE: Brand: MEDLINE

## (undated) DEVICE — FAN SPRAY KIT: Brand: PULSAVAC®

## (undated) DEVICE — Z INACTIVE USE 2660664 SOLUTION IRRIG 3000ML 0.9% SOD CHL USP UROMATIC PLAS CONT

## (undated) DEVICE — TUBING, SUCTION, 3/16" X 10', STRAIGHT: Brand: MEDLINE

## (undated) DEVICE — AGENT HEMSTAT W4XL8IN OXIDIZED REGENERATED CELOS ABSRB

## (undated) DEVICE — TUBE CULTURE LF UNIFORM BOTTOM STER